# Patient Record
Sex: FEMALE | Race: WHITE | Employment: OTHER | URBAN - METROPOLITAN AREA
[De-identification: names, ages, dates, MRNs, and addresses within clinical notes are randomized per-mention and may not be internally consistent; named-entity substitution may affect disease eponyms.]

---

## 2017-01-11 PROCEDURE — 84484 ASSAY OF TROPONIN QUANT: CPT | Performed by: INTERNAL MEDICINE

## 2017-01-11 PROCEDURE — 80061 LIPID PANEL: CPT | Performed by: INTERNAL MEDICINE

## 2017-01-11 PROCEDURE — 36415 COLL VENOUS BLD VENIPUNCTURE: CPT | Performed by: INTERNAL MEDICINE

## 2017-11-28 PROCEDURE — 36415 COLL VENOUS BLD VENIPUNCTURE: CPT | Performed by: INTERNAL MEDICINE

## 2017-11-28 PROCEDURE — 83516 IMMUNOASSAY NONANTIBODY: CPT | Performed by: INTERNAL MEDICINE

## 2017-11-28 PROCEDURE — 82784 ASSAY IGA/IGD/IGG/IGM EACH: CPT | Performed by: INTERNAL MEDICINE

## 2018-01-09 PROBLEM — D50.9 IRON DEFICIENCY ANEMIA, UNSPECIFIED IRON DEFICIENCY ANEMIA TYPE: Status: ACTIVE | Noted: 2018-01-09

## 2018-01-09 PROCEDURE — 88305 TISSUE EXAM BY PATHOLOGIST: CPT | Performed by: INTERNAL MEDICINE

## 2020-03-25 PROBLEM — I27.20 PULMONARY HYPERTENSION (HCC): Status: ACTIVE | Noted: 2020-03-25

## 2020-03-25 PROBLEM — I50.32 CHRONIC DIASTOLIC CONGESTIVE HEART FAILURE (HCC): Status: RESOLVED | Noted: 2020-03-25 | Resolved: 2020-03-25

## 2020-03-25 PROBLEM — I50.22 CHRONIC SYSTOLIC CONGESTIVE HEART FAILURE (HCC): Status: ACTIVE | Noted: 2020-03-25

## 2020-03-25 PROBLEM — I50.32 CHRONIC DIASTOLIC CONGESTIVE HEART FAILURE (HCC): Status: ACTIVE | Noted: 2020-03-25

## 2020-03-25 PROBLEM — I47.1 PSVT (PAROXYSMAL SUPRAVENTRICULAR TACHYCARDIA) (HCC): Status: ACTIVE | Noted: 2020-03-25

## 2020-03-25 PROBLEM — I47.10 PSVT (PAROXYSMAL SUPRAVENTRICULAR TACHYCARDIA) (HCC): Status: ACTIVE | Noted: 2020-03-25

## 2023-01-26 ENCOUNTER — APPOINTMENT (OUTPATIENT)
Dept: CV DIAGNOSTICS | Facility: HOSPITAL | Age: 78
End: 2023-01-26
Attending: HOSPITALIST
Payer: MEDICARE

## 2023-01-26 ENCOUNTER — APPOINTMENT (OUTPATIENT)
Dept: GENERAL RADIOLOGY | Facility: HOSPITAL | Age: 78
End: 2023-01-26
Attending: EMERGENCY MEDICINE
Payer: MEDICARE

## 2023-01-26 ENCOUNTER — HOSPITAL ENCOUNTER (OUTPATIENT)
Facility: HOSPITAL | Age: 78
Setting detail: OBSERVATION
Discharge: HOME | End: 2023-01-27
Attending: EMERGENCY MEDICINE | Admitting: INTERNAL MEDICINE
Payer: MEDICARE

## 2023-01-26 DIAGNOSIS — R06.02 SHORTNESS OF BREATH: ICD-10-CM

## 2023-01-26 DIAGNOSIS — I48.91 ATRIAL FIBRILLATION WITH RVR (HCC): Primary | ICD-10-CM

## 2023-01-26 DIAGNOSIS — E87.6 HYPOKALEMIA: ICD-10-CM

## 2023-01-26 DIAGNOSIS — R77.8 ELEVATED TROPONIN: ICD-10-CM

## 2023-01-26 PROBLEM — R79.89 ELEVATED TROPONIN: Status: ACTIVE | Noted: 2023-01-26

## 2023-01-26 LAB
ALBUMIN SERPL-MCNC: 2.5 G/DL (ref 3.4–5)
ALBUMIN/GLOB SERPL: 0.6 {RATIO} (ref 1–2)
ALP LIVER SERPL-CCNC: 71 U/L
ALT SERPL-CCNC: 18 U/L
ANION GAP SERPL CALC-SCNC: 6 MMOL/L (ref 0–18)
APTT PPP: 25.1 SECONDS (ref 23.3–35.6)
APTT PPP: 49.9 SECONDS (ref 23.3–35.6)
APTT PPP: 56.3 SECONDS (ref 23.3–35.6)
AST SERPL-CCNC: 17 U/L (ref 15–37)
BASOPHILS # BLD AUTO: 0.1 X10(3) UL (ref 0–0.2)
BASOPHILS NFR BLD AUTO: 1 %
BILIRUB SERPL-MCNC: 0.5 MG/DL (ref 0.1–2)
BUN BLD-MCNC: 12 MG/DL (ref 7–18)
CALCIUM BLD-MCNC: 8.5 MG/DL (ref 8.5–10.1)
CHLORIDE SERPL-SCNC: 109 MMOL/L (ref 98–112)
CHOLEST SERPL-MCNC: 209 MG/DL (ref ?–200)
CO2 SERPL-SCNC: 27 MMOL/L (ref 21–32)
CREAT BLD-MCNC: 1.07 MG/DL
EOSINOPHIL # BLD AUTO: 0.22 X10(3) UL (ref 0–0.7)
EOSINOPHIL NFR BLD AUTO: 2.3 %
ERYTHROCYTE [DISTWIDTH] IN BLOOD BY AUTOMATED COUNT: 13 %
GFR SERPLBLD BASED ON 1.73 SQ M-ARVRAT: 54 ML/MIN/1.73M2 (ref 60–?)
GLOBULIN PLAS-MCNC: 4.4 G/DL (ref 2.8–4.4)
GLUCOSE BLD-MCNC: 155 MG/DL (ref 70–99)
HCT VFR BLD AUTO: 48.9 %
HDLC SERPL-MCNC: 70 MG/DL (ref 40–59)
HGB BLD-MCNC: 16.4 G/DL
IMM GRANULOCYTES # BLD AUTO: 0.02 X10(3) UL (ref 0–1)
IMM GRANULOCYTES NFR BLD: 0.2 %
INR BLD: 1.05 (ref 0.85–1.16)
LDLC SERPL CALC-MCNC: 118 MG/DL (ref ?–100)
LYMPHOCYTES # BLD AUTO: 1.94 X10(3) UL (ref 1–4)
LYMPHOCYTES NFR BLD AUTO: 20.2 %
MCH RBC QN AUTO: 30 PG (ref 26–34)
MCHC RBC AUTO-ENTMCNC: 33.5 G/DL (ref 31–37)
MCV RBC AUTO: 89.4 FL
MONOCYTES # BLD AUTO: 0.87 X10(3) UL (ref 0.1–1)
MONOCYTES NFR BLD AUTO: 9.1 %
NEUTROPHILS # BLD AUTO: 6.46 X10 (3) UL (ref 1.5–7.7)
NEUTROPHILS # BLD AUTO: 6.46 X10(3) UL (ref 1.5–7.7)
NEUTROPHILS NFR BLD AUTO: 67.2 %
NONHDLC SERPL-MCNC: 139 MG/DL (ref ?–130)
NT-PROBNP SERPL-MCNC: 924 PG/ML (ref ?–450)
OSMOLALITY SERPL CALC.SUM OF ELEC: 297 MOSM/KG (ref 275–295)
PLATELET # BLD AUTO: 259 10(3)UL (ref 150–450)
POTASSIUM SERPL-SCNC: 3.3 MMOL/L (ref 3.5–5.1)
PROT SERPL-MCNC: 6.9 G/DL (ref 6.4–8.2)
PROTHROMBIN TIME: 13.7 SECONDS (ref 11.6–14.8)
Q-T INTERVAL: 312 MS
QRS DURATION: 84 MS
QTC CALCULATION (BEZET): 476 MS
R AXIS: -47 DEGREES
RBC # BLD AUTO: 5.47 X10(6)UL
SARS-COV-2 RNA RESP QL NAA+PROBE: NOT DETECTED
SODIUM SERPL-SCNC: 142 MMOL/L (ref 136–145)
T AXIS: -86 DEGREES
TRIGL SERPL-MCNC: 120 MG/DL (ref 30–149)
TROPONIN I HIGH SENSITIVITY: 147 NG/L
TROPONIN I HIGH SENSITIVITY: 151 NG/L
TSI SER-ACNC: 0.68 MIU/ML (ref 0.36–3.74)
VENTRICULAR RATE: 140 BPM
VLDLC SERPL CALC-MCNC: 21 MG/DL (ref 0–30)
WBC # BLD AUTO: 9.6 X10(3) UL (ref 4–11)

## 2023-01-26 PROCEDURE — 99285 EMERGENCY DEPT VISIT HI MDM: CPT

## 2023-01-26 PROCEDURE — 93005 ELECTROCARDIOGRAM TRACING: CPT

## 2023-01-26 PROCEDURE — 93010 ELECTROCARDIOGRAM REPORT: CPT

## 2023-01-26 PROCEDURE — 84443 ASSAY THYROID STIM HORMONE: CPT | Performed by: HOSPITALIST

## 2023-01-26 PROCEDURE — 85025 COMPLETE CBC W/AUTO DIFF WBC: CPT | Performed by: EMERGENCY MEDICINE

## 2023-01-26 PROCEDURE — 71045 X-RAY EXAM CHEST 1 VIEW: CPT | Performed by: EMERGENCY MEDICINE

## 2023-01-26 PROCEDURE — 96368 THER/DIAG CONCURRENT INF: CPT

## 2023-01-26 PROCEDURE — 80061 LIPID PANEL: CPT | Performed by: EMERGENCY MEDICINE

## 2023-01-26 PROCEDURE — 85730 THROMBOPLASTIN TIME PARTIAL: CPT | Performed by: EMERGENCY MEDICINE

## 2023-01-26 PROCEDURE — 93306 TTE W/DOPPLER COMPLETE: CPT | Performed by: HOSPITALIST

## 2023-01-26 PROCEDURE — 80053 COMPREHEN METABOLIC PANEL: CPT | Performed by: EMERGENCY MEDICINE

## 2023-01-26 PROCEDURE — 96366 THER/PROPH/DIAG IV INF ADDON: CPT

## 2023-01-26 PROCEDURE — 84484 ASSAY OF TROPONIN QUANT: CPT | Performed by: EMERGENCY MEDICINE

## 2023-01-26 PROCEDURE — 93010 ELECTROCARDIOGRAM REPORT: CPT | Performed by: INTERNAL MEDICINE

## 2023-01-26 PROCEDURE — 84484 ASSAY OF TROPONIN QUANT: CPT | Performed by: HOSPITALIST

## 2023-01-26 PROCEDURE — 85610 PROTHROMBIN TIME: CPT | Performed by: EMERGENCY MEDICINE

## 2023-01-26 PROCEDURE — 96365 THER/PROPH/DIAG IV INF INIT: CPT

## 2023-01-26 PROCEDURE — 85730 THROMBOPLASTIN TIME PARTIAL: CPT | Performed by: HOSPITALIST

## 2023-01-26 PROCEDURE — 83880 ASSAY OF NATRIURETIC PEPTIDE: CPT | Performed by: EMERGENCY MEDICINE

## 2023-01-26 RX ORDER — HEPARIN SODIUM 1000 [USP'U]/ML
5000 INJECTION, SOLUTION INTRAVENOUS; SUBCUTANEOUS ONCE
Status: COMPLETED | OUTPATIENT
Start: 2023-01-26 | End: 2023-01-26

## 2023-01-26 RX ORDER — PANTOPRAZOLE SODIUM 40 MG/1
40 TABLET, DELAYED RELEASE ORAL
Status: DISCONTINUED | OUTPATIENT
Start: 2023-01-27 | End: 2023-01-27

## 2023-01-26 RX ORDER — POTASSIUM CHLORIDE 20 MEQ/1
40 TABLET, EXTENDED RELEASE ORAL ONCE
Status: COMPLETED | OUTPATIENT
Start: 2023-01-26 | End: 2023-01-26

## 2023-01-26 RX ORDER — HEPARIN SODIUM AND DEXTROSE 10000; 5 [USP'U]/100ML; G/100ML
1000 INJECTION INTRAVENOUS ONCE
Status: COMPLETED | OUTPATIENT
Start: 2023-01-26 | End: 2023-01-27

## 2023-01-26 RX ORDER — HEPARIN SODIUM AND DEXTROSE 10000; 5 [USP'U]/100ML; G/100ML
INJECTION INTRAVENOUS CONTINUOUS
Status: DISCONTINUED | OUTPATIENT
Start: 2023-01-26 | End: 2023-01-27

## 2023-01-26 RX ORDER — ASPIRIN 81 MG/1
81 TABLET, CHEWABLE ORAL DAILY
Status: DISCONTINUED | OUTPATIENT
Start: 2023-01-27 | End: 2023-01-27

## 2023-01-26 NOTE — ED INITIAL ASSESSMENT (HPI)
Patient was at the cardiologist today, 6 month follow up for a new medication.   Patient was found to be in a-fib,   Denies SOB, palpitations, chest pain, or other  Complaints

## 2023-01-26 NOTE — ED QUICK NOTES
Orders for admission, patient is aware of plan and ready to go upstairs. Any questions, please call ED RN Adan Che  at extension [phone]. Vaccinated?  yes  Type of COVID test sent:Rapid  COVID Suspicion level: Low      Titratable drug(s) infusing:  Rate: Heparin    LOC at time of transport:A0X4    Other pertinent information:N/A    CIWA score=N/A  NIH score=N/A

## 2023-01-26 NOTE — PLAN OF CARE
Admission navigator completed with patient. Updated on POC, oriented to room/unit. All questions answered. Patient alert and oriented x 4. On RA. Up with SBA. NSR/SR on tele. Continent of bowel/bladder. No complaints of pain, shortness of breath, chest pain/discomfort. POC: cards to see, heparin gtt. Call light within reach. Fall precautions in place. Converted to NSR at 1557, EKG obtained    Problem: Patient/Family Goals  Goal: Patient/Family Long Term Goal  Description: Patient's Long Term Goal: to go home    Interventions:  - cardiology to see  -monitor HR  - See additional Care Plan goals for specific interventions  Outcome: Progressing  Goal: Patient/Family Short Term Goal  Description: Patient's Short Term Goal: manage heart rate/rhythm    Interventions:   - meds as ordered  -monitor tele  - See additional Care Plan goals for specific interventions  Outcome: Progressing     Problem: CARDIOVASCULAR - ADULT  Goal: Maintains optimal cardiac output and hemodynamic stability  Description: INTERVENTIONS:  - Monitor vital signs, rhythm, and trends  - Monitor for bleeding, hypotension and signs of decreased cardiac output  - Evaluate effectiveness of vasoactive medications to optimize hemodynamic stability  - Monitor arterial and/or venous puncture sites for bleeding and/or hematoma  - Assess quality of pulses, skin color and temperature  - Assess for signs of decreased coronary artery perfusion - ex.  Angina  - Evaluate fluid balance, assess for edema, trend weights  Outcome: Progressing  Goal: Absence of cardiac arrhythmias or at baseline  Description: INTERVENTIONS:  - Continuous cardiac monitoring, monitor vital signs, obtain 12 lead EKG if indicated  - Evaluate effectiveness of antiarrhythmic and heart rate control medications as ordered  - Initiate emergency measures for life threatening arrhythmias  - Monitor electrolytes and administer replacement therapy as ordered  Outcome: Progressing

## 2023-01-27 VITALS
RESPIRATION RATE: 17 BRPM | BODY MASS INDEX: 40.36 KG/M2 | SYSTOLIC BLOOD PRESSURE: 157 MMHG | OXYGEN SATURATION: 92 % | TEMPERATURE: 99 F | HEIGHT: 63 IN | HEART RATE: 66 BPM | DIASTOLIC BLOOD PRESSURE: 92 MMHG | WEIGHT: 227.75 LBS

## 2023-01-27 LAB
ANION GAP SERPL CALC-SCNC: 6 MMOL/L (ref 0–18)
APTT PPP: 61.3 SECONDS (ref 23.3–35.6)
APTT PPP: 63.4 SECONDS (ref 23.3–35.6)
ATRIAL RATE: 59 BPM
BASOPHILS # BLD AUTO: 0.09 X10(3) UL (ref 0–0.2)
BASOPHILS NFR BLD AUTO: 1.1 %
BUN BLD-MCNC: 11 MG/DL (ref 7–18)
CALCIUM BLD-MCNC: 8.2 MG/DL (ref 8.5–10.1)
CHLORIDE SERPL-SCNC: 112 MMOL/L (ref 98–112)
CO2 SERPL-SCNC: 27 MMOL/L (ref 21–32)
CREAT BLD-MCNC: 0.78 MG/DL
EOSINOPHIL # BLD AUTO: 0.23 X10(3) UL (ref 0–0.7)
EOSINOPHIL NFR BLD AUTO: 2.7 %
ERYTHROCYTE [DISTWIDTH] IN BLOOD BY AUTOMATED COUNT: 13.2 %
GFR SERPLBLD BASED ON 1.73 SQ M-ARVRAT: 79 ML/MIN/1.73M2 (ref 60–?)
GLUCOSE BLD-MCNC: 100 MG/DL (ref 70–99)
HCT VFR BLD AUTO: 45 %
HGB BLD-MCNC: 14.7 G/DL
IMM GRANULOCYTES # BLD AUTO: 0.02 X10(3) UL (ref 0–1)
IMM GRANULOCYTES NFR BLD: 0.2 %
LYMPHOCYTES # BLD AUTO: 2.56 X10(3) UL (ref 1–4)
LYMPHOCYTES NFR BLD AUTO: 30.5 %
MCH RBC QN AUTO: 30 PG (ref 26–34)
MCHC RBC AUTO-ENTMCNC: 32.7 G/DL (ref 31–37)
MCV RBC AUTO: 91.8 FL
MONOCYTES # BLD AUTO: 0.9 X10(3) UL (ref 0.1–1)
MONOCYTES NFR BLD AUTO: 10.7 %
NEUTROPHILS # BLD AUTO: 4.58 X10 (3) UL (ref 1.5–7.7)
NEUTROPHILS # BLD AUTO: 4.58 X10(3) UL (ref 1.5–7.7)
NEUTROPHILS NFR BLD AUTO: 54.8 %
OSMOLALITY SERPL CALC.SUM OF ELEC: 299 MOSM/KG (ref 275–295)
P AXIS: 71 DEGREES
P-R INTERVAL: 154 MS
PLATELET # BLD AUTO: 222 10(3)UL (ref 150–450)
POTASSIUM SERPL-SCNC: 3.6 MMOL/L (ref 3.5–5.1)
Q-T INTERVAL: 468 MS
QRS DURATION: 82 MS
QTC CALCULATION (BEZET): 463 MS
R AXIS: -44 DEGREES
RBC # BLD AUTO: 4.9 X10(6)UL
SODIUM SERPL-SCNC: 145 MMOL/L (ref 136–145)
T AXIS: -75 DEGREES
TROPONIN I HIGH SENSITIVITY: 122 NG/L
VENTRICULAR RATE: 59 BPM
WBC # BLD AUTO: 8.4 X10(3) UL (ref 4–11)

## 2023-01-27 PROCEDURE — 80048 BASIC METABOLIC PNL TOTAL CA: CPT | Performed by: HOSPITALIST

## 2023-01-27 PROCEDURE — 85730 THROMBOPLASTIN TIME PARTIAL: CPT | Performed by: HOSPITALIST

## 2023-01-27 PROCEDURE — 85025 COMPLETE CBC W/AUTO DIFF WBC: CPT | Performed by: HOSPITALIST

## 2023-01-27 PROCEDURE — 84484 ASSAY OF TROPONIN QUANT: CPT | Performed by: INTERNAL MEDICINE

## 2023-01-27 RX ORDER — DILTIAZEM HYDROCHLORIDE 240 MG/1
240 CAPSULE, COATED, EXTENDED RELEASE ORAL DAILY
Status: DISCONTINUED | OUTPATIENT
Start: 2023-01-27 | End: 2023-01-27

## 2023-01-27 RX ORDER — LOSARTAN POTASSIUM 50 MG/1
50 TABLET ORAL DAILY
Status: DISCONTINUED | OUTPATIENT
Start: 2023-01-27 | End: 2023-01-27

## 2023-01-27 RX ORDER — LOSARTAN POTASSIUM 50 MG/1
50 TABLET ORAL DAILY
Qty: 90 TABLET | Refills: 3 | Status: SHIPPED | OUTPATIENT
Start: 2023-01-27

## 2023-01-27 RX ORDER — LEVOTHYROXINE SODIUM 0.15 MG/1
150 TABLET ORAL
COMMUNITY
Start: 2022-12-13

## 2023-01-27 RX ORDER — DILTIAZEM HYDROCHLORIDE 180 MG/1
180 CAPSULE, COATED, EXTENDED RELEASE ORAL DAILY
Qty: 90 CAPSULE | Refills: 3 | Status: SHIPPED | OUTPATIENT
Start: 2023-01-27

## 2023-01-27 RX ORDER — AMLODIPINE BESYLATE 5 MG/1
5 TABLET ORAL DAILY
Status: DISCONTINUED | OUTPATIENT
Start: 2023-01-27 | End: 2023-01-27

## 2023-01-27 RX ORDER — LEVOTHYROXINE SODIUM 0.15 MG/1
150 TABLET ORAL
Status: DISCONTINUED | OUTPATIENT
Start: 2023-01-27 | End: 2023-01-27

## 2023-01-27 RX ORDER — METOPROLOL SUCCINATE 100 MG/1
100 TABLET, EXTENDED RELEASE ORAL
Status: DISCONTINUED | OUTPATIENT
Start: 2023-01-27 | End: 2023-01-27

## 2023-01-27 RX ORDER — DILTIAZEM HYDROCHLORIDE 180 MG/1
180 CAPSULE, EXTENDED RELEASE ORAL DAILY
Status: DISCONTINUED | OUTPATIENT
Start: 2023-01-27 | End: 2023-01-27

## 2023-01-27 RX ORDER — LISINOPRIL 20 MG/1
20 TABLET ORAL
COMMUNITY
Start: 2022-12-13 | End: 2023-01-27

## 2023-01-27 NOTE — PLAN OF CARE
Pt. Alert and oriented x4. No pain/discomforts made. Resp. Regular and easy. On RA. SR on Tele. Cardizem drip was discontinued. Heparin drip still ongoing. Bleeding precautions maintained. Up w/ minimal assist. POC explained. Verbalized understanding. Needs attended promptly. Call light w/in reach. 36 - Dr. Joseph Mems  aware of troponin results and resumed Metoprolol and Amlodipine as clarified. Problem: Patient/Family Goals  Goal: Patient/Family Long Term Goal  Description: Patient's Long Term Goal: to go home    Interventions:  - cardiology to see  -monitor HR  - See additional Care Plan goals for specific interventions  Outcome: Progressing  Goal: Patient/Family Short Term Goal  Description: Patient's Short Term Goal: manage heart rate/rhythm    Interventions:   - meds as ordered  -monitor tele  - See additional Care Plan goals for specific interventions  Outcome: Progressing     Problem: CARDIOVASCULAR - ADULT  Goal: Maintains optimal cardiac output and hemodynamic stability  Description: INTERVENTIONS:  - Monitor vital signs, rhythm, and trends  - Monitor for bleeding, hypotension and signs of decreased cardiac output  - Evaluate effectiveness of vasoactive medications to optimize hemodynamic stability  - Monitor arterial and/or venous puncture sites for bleeding and/or hematoma  - Assess quality of pulses, skin color and temperature  - Assess for signs of decreased coronary artery perfusion - ex.  Angina  - Evaluate fluid balance, assess for edema, trend weights  Outcome: Progressing  Goal: Absence of cardiac arrhythmias or at baseline  Description: INTERVENTIONS:  - Continuous cardiac monitoring, monitor vital signs, obtain 12 lead EKG if indicated  - Evaluate effectiveness of antiarrhythmic and heart rate control medications as ordered  - Initiate emergency measures for life threatening arrhythmias  - Monitor electrolytes and administer replacement therapy as ordered  Outcome: Progressing

## 2023-01-27 NOTE — PLAN OF CARE
Patient laying in bed, denies chest pain, shortness of breath and dizziness. Patient alert and  oriented, on RA, up at priyanka in room. Heparin gtt discontinued. NSR on cardiac monitor. Plan for possible discharge. Call light with in reach, fall precautions reviewed, all questions answered. Problem: CARDIOVASCULAR - ADULT  Goal: Maintains optimal cardiac output and hemodynamic stability  Description: INTERVENTIONS:  - Monitor vital signs, rhythm, and trends  - Monitor for bleeding, hypotension and signs of decreased cardiac output  - Evaluate effectiveness of vasoactive medications to optimize hemodynamic stability  - Monitor arterial and/or venous puncture sites for bleeding and/or hematoma  - Assess quality of pulses, skin color and temperature  - Assess for signs of decreased coronary artery perfusion - ex.  Angina  - Evaluate fluid balance, assess for edema, trend weights  Outcome: Progressing

## 2023-01-27 NOTE — CM/SW NOTE
Script for eliquis sent to patient's [facility] (phone [phone])--informed eliquis not formulary medication (Raji Foster is)  Number for pharmacy  ID [id number]  apn to send xarelto script--need to check cost shortly    Cost of xarelto to be $40.oo per month once $241. oo deductible met--applied 30 day free trial card--pharmacy open 24-hours

## 2025-06-04 ENCOUNTER — HOSPITAL ENCOUNTER (INPATIENT)
Facility: HOSPITAL | Age: 80
LOS: 6 days | Discharge: HOME HEALTH | End: 2025-06-10
Attending: EMERGENCY MEDICINE | Admitting: INTERNAL MEDICINE
Payer: MEDICARE

## 2025-06-04 ENCOUNTER — APPOINTMENT (OUTPATIENT)
Dept: GENERAL RADIOLOGY | Facility: HOSPITAL | Age: 80
End: 2025-06-04
Attending: EMERGENCY MEDICINE
Payer: MEDICARE

## 2025-06-04 ENCOUNTER — HOSPITAL ENCOUNTER (INPATIENT)
Facility: HOSPITAL | Age: 80
LOS: 6 days | Discharge: HOME | End: 2025-06-10
Attending: EMERGENCY MEDICINE | Admitting: INTERNAL MEDICINE
Payer: MEDICARE

## 2025-06-04 DIAGNOSIS — I50.9 ACUTE CONGESTIVE HEART FAILURE, UNSPECIFIED HEART FAILURE TYPE (HCC): Primary | ICD-10-CM

## 2025-06-04 LAB
ALBUMIN SERPL-MCNC: 4.2 G/DL (ref 3.2–4.8)
ALBUMIN/GLOB SERPL: 1.5 {RATIO} (ref 1–2)
ALP LIVER SERPL-CCNC: 61 U/L (ref 55–142)
ALT SERPL-CCNC: 31 U/L (ref 10–49)
ANION GAP SERPL CALC-SCNC: 13 MMOL/L (ref 0–18)
AST SERPL-CCNC: 36 U/L (ref ?–34)
ATRIAL RATE: 357 BPM
BASOPHILS # BLD AUTO: 0.1 X10(3) UL (ref 0–0.2)
BASOPHILS NFR BLD AUTO: 0.8 %
BILIRUB SERPL-MCNC: 0.8 MG/DL (ref 0.2–1.1)
BUN BLD-MCNC: 19 MG/DL (ref 9–23)
CALCIUM BLD-MCNC: 8.9 MG/DL (ref 8.7–10.6)
CHLORIDE SERPL-SCNC: 106 MMOL/L (ref 98–112)
CHOLEST SERPL-MCNC: 144 MG/DL (ref ?–200)
CO2 SERPL-SCNC: 26 MMOL/L (ref 21–32)
CREAT BLD-MCNC: 1.26 MG/DL (ref 0.55–1.02)
EGFRCR SERPLBLD CKD-EPI 2021: 44 ML/MIN/1.73M2 (ref 60–?)
EOSINOPHIL # BLD AUTO: 0.23 X10(3) UL (ref 0–0.7)
EOSINOPHIL NFR BLD AUTO: 1.8 %
ERYTHROCYTE [DISTWIDTH] IN BLOOD BY AUTOMATED COUNT: 18.3 %
GLOBULIN PLAS-MCNC: 2.8 G/DL (ref 2–3.5)
GLUCOSE BLD-MCNC: 127 MG/DL (ref 70–99)
HCT VFR BLD AUTO: 41.6 % (ref 35–48)
HDLC SERPL-MCNC: 56 MG/DL (ref 40–59)
HGB BLD-MCNC: 12.6 G/DL (ref 12–16)
IMM GRANULOCYTES # BLD AUTO: 0.06 X10(3) UL (ref 0–1)
IMM GRANULOCYTES NFR BLD: 0.5 %
LDLC SERPL CALC-MCNC: 69 MG/DL (ref ?–100)
LYMPHOCYTES # BLD AUTO: 3.49 X10(3) UL (ref 1–4)
LYMPHOCYTES NFR BLD AUTO: 27 %
MCH RBC QN AUTO: 23.3 PG (ref 26–34)
MCHC RBC AUTO-ENTMCNC: 30.3 G/DL (ref 31–37)
MCV RBC AUTO: 77 FL (ref 80–100)
MONOCYTES # BLD AUTO: 1.35 X10(3) UL (ref 0.1–1)
MONOCYTES NFR BLD AUTO: 10.4 %
NEUTROPHILS # BLD AUTO: 7.71 X10 (3) UL (ref 1.5–7.7)
NEUTROPHILS # BLD AUTO: 7.71 X10(3) UL (ref 1.5–7.7)
NEUTROPHILS NFR BLD AUTO: 59.5 %
NONHDLC SERPL-MCNC: 88 MG/DL (ref ?–130)
NT-PROBNP SERPL-MCNC: 2446 PG/ML (ref ?–450)
OSMOLALITY SERPL CALC.SUM OF ELEC: 304 MOSM/KG (ref 275–295)
PLATELET # BLD AUTO: 321 10(3)UL (ref 150–450)
POTASSIUM SERPL-SCNC: 4.1 MMOL/L (ref 3.5–5.1)
PROT SERPL-MCNC: 7 G/DL (ref 5.7–8.2)
Q-T INTERVAL: 328 MS
QRS DURATION: 82 MS
QTC CALCULATION (BEZET): 495 MS
R AXIS: -22 DEGREES
RBC # BLD AUTO: 5.4 X10(6)UL (ref 3.8–5.3)
SODIUM SERPL-SCNC: 145 MMOL/L (ref 136–145)
T AXIS: 217 DEGREES
T4 FREE SERPL-MCNC: 2.1 NG/DL (ref 0.8–1.7)
TRIGL SERPL-MCNC: 102 MG/DL (ref 30–149)
TROPONIN I SERPL HS-MCNC: 55 NG/L (ref ?–34)
TROPONIN I SERPL HS-MCNC: 61 NG/L (ref ?–34)
TSI SER-ACNC: 0.55 UIU/ML (ref 0.55–4.78)
VENTRICULAR RATE: 137 BPM
VLDLC SERPL CALC-MCNC: 15 MG/DL (ref 0–30)
WBC # BLD AUTO: 12.9 X10(3) UL (ref 4–11)

## 2025-06-04 PROCEDURE — 93010 ELECTROCARDIOGRAM REPORT: CPT

## 2025-06-04 PROCEDURE — 99285 EMERGENCY DEPT VISIT HI MDM: CPT

## 2025-06-04 PROCEDURE — 84484 ASSAY OF TROPONIN QUANT: CPT | Performed by: EMERGENCY MEDICINE

## 2025-06-04 PROCEDURE — 96374 THER/PROPH/DIAG INJ IV PUSH: CPT

## 2025-06-04 PROCEDURE — 71045 X-RAY EXAM CHEST 1 VIEW: CPT | Performed by: EMERGENCY MEDICINE

## 2025-06-04 PROCEDURE — 84439 ASSAY OF FREE THYROXINE: CPT | Performed by: INTERNAL MEDICINE

## 2025-06-04 PROCEDURE — 80061 LIPID PANEL: CPT | Performed by: EMERGENCY MEDICINE

## 2025-06-04 PROCEDURE — 84443 ASSAY THYROID STIM HORMONE: CPT | Performed by: INTERNAL MEDICINE

## 2025-06-04 PROCEDURE — 85025 COMPLETE CBC W/AUTO DIFF WBC: CPT | Performed by: EMERGENCY MEDICINE

## 2025-06-04 PROCEDURE — 83880 ASSAY OF NATRIURETIC PEPTIDE: CPT | Performed by: EMERGENCY MEDICINE

## 2025-06-04 PROCEDURE — 80053 COMPREHEN METABOLIC PANEL: CPT | Performed by: EMERGENCY MEDICINE

## 2025-06-04 PROCEDURE — 93005 ELECTROCARDIOGRAM TRACING: CPT

## 2025-06-04 PROCEDURE — 84484 ASSAY OF TROPONIN QUANT: CPT | Performed by: INTERNAL MEDICINE

## 2025-06-04 RX ORDER — FUROSEMIDE 10 MG/ML
40 INJECTION INTRAMUSCULAR; INTRAVENOUS ONCE
Status: COMPLETED | OUTPATIENT
Start: 2025-06-04 | End: 2025-06-04

## 2025-06-04 RX ORDER — FUROSEMIDE 10 MG/ML
20 INJECTION INTRAMUSCULAR; INTRAVENOUS ONCE
Status: COMPLETED | OUTPATIENT
Start: 2025-06-04 | End: 2025-06-04

## 2025-06-04 RX ORDER — PANTOPRAZOLE SODIUM 40 MG/1
40 TABLET, DELAYED RELEASE ORAL
Status: DISCONTINUED | OUTPATIENT
Start: 2025-06-04 | End: 2025-06-10

## 2025-06-04 RX ORDER — LOSARTAN POTASSIUM 100 MG/1
TABLET ORAL DAILY
COMMUNITY
End: 2025-06-10

## 2025-06-04 RX ORDER — LEVOTHYROXINE SODIUM 150 UG/1
150 TABLET ORAL
Status: DISCONTINUED | OUTPATIENT
Start: 2025-06-05 | End: 2025-06-05

## 2025-06-04 RX ORDER — POTASSIUM CHLORIDE 1500 MG/1
30 TABLET, EXTENDED RELEASE ORAL DAILY
COMMUNITY
End: 2025-06-10

## 2025-06-04 RX ORDER — PANTOPRAZOLE SODIUM 40 MG/1
40 TABLET, DELAYED RELEASE ORAL
Status: ON HOLD | COMMUNITY
End: 2025-06-10

## 2025-06-04 RX ORDER — SPIRONOLACTONE 25 MG/1
12.5 TABLET ORAL DAILY
Status: DISCONTINUED | OUTPATIENT
Start: 2025-06-04 | End: 2025-06-10

## 2025-06-04 RX ORDER — SPIRONOLACTONE 25 MG/1
12.5 TABLET ORAL DAILY
COMMUNITY

## 2025-06-04 RX ORDER — METOPROLOL SUCCINATE 50 MG/1
50 TABLET, EXTENDED RELEASE ORAL
Status: DISCONTINUED | OUTPATIENT
Start: 2025-06-04 | End: 2025-06-06

## 2025-06-04 RX ORDER — DILTIAZEM HYDROCHLORIDE 5 MG/ML
10 INJECTION INTRAVENOUS ONCE
Status: COMPLETED | OUTPATIENT
Start: 2025-06-04 | End: 2025-06-04

## 2025-06-04 RX ORDER — FUROSEMIDE 20 MG/1
30 TABLET ORAL DAILY
COMMUNITY
End: 2025-06-10

## 2025-06-04 NOTE — ED PROVIDER NOTES
Patient Seen in: [facility] Emergency Department        History  Chief Complaint   Patient presents with    Difficulty Breathing     Stated Complaint: ANDREA for a month    Subjective:   HPI            Patient has a medical history notable.  Known A-fib, heart failure  with reduced EF, here for worsening dyspnea exertion for the last month.    No chest pain    No fevers compliant with medications          Objective:     Past Medical History:    CANCER    Thyroid CA    Cancer (HCC)    tyroid    Essential hypertension    HYPOTHYROIDISM    After surgery    Thyroid disease              Past Surgical History:   Procedure Laterality Date    Cholecystectomy  2001    Hysterectomy  1999    Thyroidectomy  1999                Social History     Socioeconomic History    Marital status:    Tobacco Use    Smoking status: Never    Smokeless tobacco: Never   Substance and Sexual Activity    Alcohol use: No    Drug use: No                                Physical Exam    ED Triage Vitals   BP 06/04/25 1352 124/68   Pulse 06/04/25 1352 115   Resp 06/04/25 1352 26   Temp 06/04/25 1355 98.5 °F (36.9 °C)   Temp src 06/04/25 1355 Temporal   SpO2 06/04/25 1352 91 %   O2 Device 06/04/25 1352 None (Room air)       Current Vitals:   Vital Signs  BP: 101/74  Pulse: 91  Resp: 26  Temp: 98.5 °F (36.9 °C)  Temp src: Temporal  MAP (mmHg): 84    Oxygen Therapy  SpO2: 99 %  O2 Device: None (Room air)              Physical Exam    Physical Exam   Constitutional: Awake, alert, well appearing  Head: Normocephalic and atraumatic.   Eyes: Conjunctivae are normal. Pupils are equal, round, and reactive to light.   Neck: Normal range of motion. No JVD  Cardiovascular: Normal rate, regular rhythm    Abdominal: Soft. Not distended.  Neurological: Pt is alert and oriented to person, place, and time. no cranial nerve deficits. Speech fluent      Notably tachypneic, bibasilar crackles    2+ pitting edema in the legs    Appears a bit pale        ED  Course  Labs Reviewed   COMP METABOLIC PANEL (14) - Abnormal; Notable for the following components:       Result Value    Glucose 127 (*)     Creatinine 1.26 (*)     Calculated Osmolality 304 (*)     eGFR-Cr 44 (*)     AST 36 (*)     All other components within normal limits   CBC WITH DIFFERENTIAL WITH PLATELET - Abnormal; Notable for the following components:    WBC 12.9 (*)     RBC 5.40 (*)     MCV 77.0 (*)     MCH 23.3 (*)     MCHC 30.3 (*)     Neutrophil Absolute Prelim 7.71 (*)     Neutrophil Absolute 7.71 (*)     Monocyte Absolute 1.35 (*)     All other components within normal limits   TROPONIN I HIGH SENSITIVITY - Abnormal; Notable for the following components:    Troponin I (High Sensitivity) 61 (*)     All other components within normal limits   PRO BETA NATRIURETIC PEPTIDE - Abnormal; Notable for the following components:    Pro-Beta Natriuretic Peptide 2,446 (*)     All other components within normal limits   LIPID PANEL - Normal   RAINBOW DRAW BLUE   RAINBOW DRAW GOLD     EKG    Rate, intervals and axes as noted on EKG Report.  Rate: 137  Rhythm: Atrial flutter  Reading: Atrial flutter with variable block              Heart rate improved on its own    Blood work reviewed.  Elevated proBNP CBC electrolytes otherwise fine trivial troponin elevation    XR CHEST AP PORTABLE  (CPT=71045)  Result Date: 6/4/2025  CONCLUSION:  Cardiomegaly.  There is pulmonary venous congestion.  There is bibasilar airspace disease with bilateral pleural effusions right greater than left.  Findings consistent with CHF.  Superimposed bibasilar pneumonia not to be excluded.   LOCATION:  [facility]      Dictated by (CST): Brianne Mayer MD on 6/04/2025 at 3:02 PM     Finalized by (CST): Brianne Mayer MD on 6/04/2025 at 3:06 PM           Medications   furosemide (Lasix) 10 mg/mL injection 40 mg (40 mg Intravenous Given 6/4/25 1511)                       MDM         Differential diagnoses considered: Suspect acute CHF.  The possibly  of a life-threatening pneumothorax or ACS was also considered    -Diuresis    -Flutter now rate controlled without intervention already on Xarelto.    Dr. Hagan made aware via [employer] text    Patient will be admitted primarily to the [facility] hospitalist.  Care has been discussed with the admitting physician.      I visualized the radiology studies, my independent interpretation: Consistent with venous congestion and right pleural effusion    *Discussion of ongoing management of this patient's care included: Cardiology, admitting physician      Shared decision making was done by: patient, myself.      Admission disposition: 6/4/2025  3:21 PM           Medical Decision Making      Disposition and Plan     Clinical Impression:  1. Acute congestive heart failure, unspecified heart failure type (HCC)         Disposition:  Admit  6/4/2025  3:21 pm    Follow-up:  No follow-up provider specified.        Medications Prescribed:  Current Discharge Medication List                Supplementary Documentation:         Hospital Problems       Present on Admission  Date Reviewed: 4/4/2022          ICD-10-CM Noted POA    * (Principal) Acute congestive heart failure, unspecified heart failure type (HCC) I50.9 6/4/2025 Unknown

## 2025-06-04 NOTE — PLAN OF CARE
Problem: CARDIOVASCULAR - ADULT  Goal: Maintains optimal cardiac output and hemodynamic stability  Description: INTERVENTIONS:  - Monitor vital signs, rhythm, and trends  - Monitor for bleeding, hypotension and signs of decreased cardiac output  - Evaluate effectiveness of vasoactive medications to optimize hemodynamic stability  - Monitor arterial and/or venous puncture sites for bleeding and/or hematoma  - Assess quality of pulses, skin color and temperature  - Assess for signs of decreased coronary artery perfusion - ex. Angina  - Evaluate fluid balance, assess for edema, trend weights  Outcome: Progressing  Goal: Absence of cardiac arrhythmias or at baseline  Description: INTERVENTIONS:  - Continuous cardiac monitoring, monitor vital signs, obtain 12 lead EKG if indicated  - Evaluate effectiveness of antiarrhythmic and heart rate control medications as ordered  - Initiate emergency measures for life threatening arrhythmias  - Monitor electrolytes and administer replacement therapy as ordered  Outcome: Progressing     Problem: METABOLIC/FLUID AND ELECTROLYTES - ADULT  Goal: Electrolytes maintained within normal limits  Description: INTERVENTIONS:  - Monitor labs and rhythm and assess patient for signs and symptoms of electrolyte imbalances  - Administer electrolyte replacement as ordered  - Monitor response to electrolyte replacements, including rhythm and repeat lab results as appropriate  - Fluid restriction as ordered  - Instruct patient on fluid and nutrition restrictions as appropriate  Outcome: Progressing  Goal: Hemodynamic stability and optimal renal function maintained  Description: INTERVENTIONS:  - Monitor labs and assess for signs and symptoms of volume excess or deficit  - Monitor intake, output and patient weight  - Monitor urine specific gravity, serum osmolarity and serum sodium as indicated or ordered  - Monitor response to interventions for patient's volume status, including labs, urine  output, blood pressure (other measures as available)  - Encourage oral intake as appropriate  - Instruct patient on fluid and nutrition restrictions as appropriate  Outcome: Progressing     Problem: Patient/Family Goals  Goal: Patient/Family Long Term Goal  Description: Patient's Long Term Goal: dc home    Interventions:  - echo  -IV lasix   - See additional Care Plan goals for specific interventions  Outcome: Progressing  Goal: Patient/Family Short Term Goal  Description: Patient's Short Term Goal:     Interventions:   -   - See additional Care Plan goals for specific interventions  Outcome: Progressing

## 2025-06-04 NOTE — PLAN OF CARE
NURSING ADMISSION NOTE      Patient admitted via Cart  Oriented to room.  Safety precautions initiated.  Bed in low position.  Call light in reach.    Navigator complete down in ED  YANG/Ox4, RA  Cardizem gtt @ 10mL/hr

## 2025-06-04 NOTE — H&P
General Medicine H&P     Chief Complaint   Patient presents with    Difficulty Breathing        PCP: Roby Llamas MD    History of Present Illness: Patient is a 79 year old female with PMH including but not limited to thyroid ca, HT, HTN, afib, HFpEF who p/t [facility] ED c SOB x couple months.     Pt has been trying to treat c different meds but got worse last night. +Wheezing, couldn't catch breath. Couldn't sleep. Little papitations, no cp. Legs always swollen, R side worse. She saw WALTER in CHF clinic and aureliano 12.5 mg daily was added. Pt did not tolerate jardiance.     Admit date: 1/26/2023-1/27/2023 for afib/rvr, known to me from then.     .     No lung issues. No tob, 2-3x/wk wine.       Past Medical History[1]   Past Surgical History[2]     ALL:  Allergies[3]     Scheduled Meds[4]    Social History     Tobacco Use    Smoking status: Never    Smokeless tobacco: Never   Substance Use Topics    Alcohol use: No        Fam Hx  Family History[5]    Review of Systems  Comprehensive ROS reviewed and negative except for what's stated above. \    OBJECTIVE:  /74   Pulse 91   Temp 98.5 °F (36.9 °C) (Temporal)   Resp 26   SpO2 99%     General:  Alert, no distress, appears stated age.    Head:  Normocephalic, without obvious abnormality, atraumatic.   Eyes:  Sclera anicteric, EOMs intact.    Nose: Nares normal,  Mucosa normal    Throat: Lips normal   Neck: Supple, symmetrical, trachea midline   Lungs:   Clear to auscultation bilaterally. Normal effort   Chest wall:  No tenderness or deformity   Heart:  Irregular rate and rhythm, S1, S2 normal, no murmur, rub or gallop appreciated   Abdomen:   Soft, NT/ND, Bowel sounds normal. No masses,  No organomegaly.    Extremities/MSK: 2+edema.   Skin: Skin color, texture, turgor normal. No rashes or lesions.    Neurologic: Moving all extremities spontaneously, no focal deficit appreciated          LABS:   Lab Results   Component Value Date    WBC 12.9 06/04/2025    HGB  12.6 06/04/2025    HCT 41.6 06/04/2025    .0 06/04/2025    CREATSERUM 1.26 06/04/2025    BUN 19 06/04/2025     06/04/2025    K 4.1 06/04/2025     06/04/2025    CO2 26.0 06/04/2025     06/04/2025    CA 8.9 06/04/2025    ALB 4.2 06/04/2025    ALKPHO 61 06/04/2025    BILT 0.8 06/04/2025    TP 7.0 06/04/2025    AST 36 06/04/2025    ALT 31 06/04/2025       Radiology: XR CHEST AP PORTABLE  (CPT=71045)  Result Date: 6/4/2025  PROCEDURE:  XR CHEST AP PORTABLE  (CPT=71045)  TECHNIQUE:  AP chest radiograph was obtained.  COMPARISON:  [facility] , XR, XR CHEST AP PORTABLE  (CPT=71045), 1/26/2023, 11:39 AM.  INDICATIONS:  ANDREA for a month  PATIENT STATED HISTORY: (As transcribed by Technologist)  Patient states she has difficulty breathing but no chest pain.                 CONCLUSION:  Cardiomegaly.  There is pulmonary venous congestion.  There is bibasilar airspace disease with bilateral pleural effusions right greater than left.  Findings consistent with CHF.  Superimposed bibasilar pneumonia not to be excluded.   LOCATION:  [facility]      Dictated by (CST): Brianne Mayer MD on 6/04/2025 at 3:02 PM     Finalized by (CST): Brianne Mayer MD on 6/04/2025 at 3:06 PM            ASSESSMENT / PLAN:    79 year old female with PMH including but not limited to thyroid ca, HT, HTN, afib, HFpEF who p/t EH ED c SOB x couple months.      Acute HFpEF   - tele  - cards following, apprec  - IV lasix  - echo  - cont home meds  - daily I/Os, wts    A. fib with RVR   -Cardiology recommendations; IV cardizem  -xarelto   -[id number] =3     Hypothyroidism  -Resume home Synthroid   -Check TSH     Hypertension  -Resume home antihypertensives as HR allows      Morbid obesity BMI >40  -Outpatient follow-up on resolution of above issues      Outpatient records or previous hospital records reviewed. [facility] hospitalist to continue to follow patient while in house. A total of 75 minutes taken.  Greater than 50% face to face encounter.   D/w pt/, Ambar Boland MD Charles Yohannan, MD  [facility] Hospitalist  Pager: [phone]  6/4/2025  3:47 PM               [1]   Past Medical History:   CANCER    Thyroid CA    Cancer (HCC)    tyroid    Essential hypertension    HYPOTHYROIDISM    After surgery    Thyroid disease   [2]   Past Surgical History:  Procedure Laterality Date    Cholecystectomy  2001    Hysterectomy  1999    Thyroidectomy  1999   [3]   Allergies  Allergen Reactions    Adhesive Tape RASH   [4] [5]   Family History  Problem Relation Age of Onset    Hypertension Father     Cancer Father         Throat CA    Hypertension Mother     Diabetes Sister         DM I    Other (Asthma) Daughter     Hypertension Maternal Grandmother     Hypertension Maternal Grandfather     Hypertension Paternal Grandmother     Hypertension Paternal Grandfather

## 2025-06-04 NOTE — CONSULTS
[facility] Cardiology Consultation    Sariah Montague Patient Status:  Emergency    10/18/1945 MRN [id number]   [facility] EMERGENCY DEPARTMENT Attending Ambar Boland MD   Hosp Day # 0 PCP Roby Llamas MD     Reason for Consultation:  CHF      History of Present Illness:  Sariah Montague is a a(n) 79 year old female. She has Paroxysmal Atrial Fibrillation  (xarelto), HFpEF, PSVT, , Hypertension . She has chronic JON and chronic LE edema.  She saw WALTER jose in CHF clinic and aureliano, 12.5 mg daily was added.  Pt did not tolerate jardiance.  She came to ER due to worse JON, orthopnea, LE edema.  Received IV lasix.      History:  Past Medical History[1]  Past Surgical History[2]  Family History[3]      Allergies:  Allergies[4]    Medications:  Scheduled Medications[5]    Continuous Infusions:  Medication Infusions[6]    Social History:   reports that she has never smoked. She has never used smokeless tobacco. She reports that she does not drink alcohol and does not use drugs.    Review of Systems:  All systems were reviewed and are negative except as described above in HPI.    Physical Exam:      Wt Readings from Last 3 Encounters:   23 227 lb 11.8 oz (103.3 kg)   22 220 lb (99.8 kg)   21 228 lb (103.4 kg)       Vitals:    25 1355 25 1445 25 1515 25 1615   BP:  101/74 105/53 115/86   Pulse:  91 116 114   Resp:   24   Temp: 98.5 °F (36.9 °C)      TempSrc: Temporal      SpO2:  99% 98% 100%       Temp:  [98.5 °F (36.9 °C)] 98.5 °F (36.9 °C)  Pulse:  [] 114  Resp:  [24-26] 24  BP: (101-124)/(53-86) 115/86  SpO2:  [91 %-100 %] 100 %    Temp: 98.5 °F (36.9 °C)  Pulse: 114  Resp: 24  BP: 115/86      General:  Appears comfortable  HEENT: No focal deficits.  Neck: No JVD, carotids 2+ no bruits.  Cardiac: Irregular S1S2.  No S3, S4, rub, click.  No murmur.  Lungs: Clear to auscultation and percussion.  Abdomen: Soft, non-tender.   Extremities: 2+  LE edema.  No  clubbing or cyanosis  Neurologic: Alert and oriented, normal affect.  Skin: Warm and dry.     Labs:      HEM:  Recent Labs   Lab 06/04/25  1359   WBC 12.9*   HGB 12.6   HCT 41.6   .0       Chem:  Recent Labs   Lab 06/04/25  1359      K 4.1      CO2 26.0   BUN 19   CREATSERUM 1.26*   ALT 31   AST 36*   ALB 4.2       No results for input(s): \"INR\" in the last 168 hours.                  Lab Results   Component Value Date    TROP 0.147 () 01/11/2017    TROP 0.101 () 11/04/2016    TROP 0.184 () 02/05/2016         Invalid input(s): \"PBNPML\"                 Telemetry: atrial flutter/fib    Laboratories and Data:  Diagnostics:    EKG, 6/4/2025:  atrial flutter, 137/min, NSTW changes    CXR, 6/4/2025:      CONCLUSION:  Cardiomegaly.  There is pulmonary venous congestion.      There is bibasilar airspace disease with bilateral pleural effusions right greater than left.      Findings consistent with CHF.  Superimposed bibasilar pneumonia not to be excluded.           Impression:    1.  Acute HFpEF  2. Paroxysmal Atrial Fibrillation/flutter.  RVR today (which may have triggered #1)  3. Hypertension   4. obesity      Recommend:    1.  Telemetry  2. IV lasix  3. HR control: IV cardizem  4. Continue op CHF meds, DOAC  5. Echo once HR is controlled          Nigel Hagan MD  6/4/2025  4:52 PM         [1]   Past Medical History:   CANCER    Thyroid CA    Cancer (HCC)    tyroid    Essential hypertension    HYPOTHYROIDISM    After surgery    Thyroid disease   [2]   Past Surgical History:  Procedure Laterality Date    Cholecystectomy  2001    Hysterectomy  1999    Thyroidectomy  1999   [3]   Family History  Problem Relation Age of Onset    Hypertension Father     Cancer Father         Throat CA    Hypertension Mother     Diabetes Sister         DM I    Other (Asthma) Daughter     Hypertension Maternal Grandmother     Hypertension Maternal Grandfather     Hypertension Paternal Grandmother      Hypertension Paternal Grandfather    [4]   Allergies  Allergen Reactions    Adhesive Tape RASH   [5] [6]

## 2025-06-05 ENCOUNTER — APPOINTMENT (OUTPATIENT)
Dept: CV DIAGNOSTICS | Facility: HOSPITAL | Age: 80
End: 2025-06-05
Attending: INTERNAL MEDICINE
Payer: MEDICARE

## 2025-06-05 LAB
ANION GAP SERPL CALC-SCNC: 9 MMOL/L (ref 0–18)
BASOPHILS # BLD AUTO: 0.08 X10(3) UL (ref 0–0.2)
BASOPHILS NFR BLD AUTO: 0.8 %
BUN BLD-MCNC: 19 MG/DL (ref 9–23)
CALCIUM BLD-MCNC: 8.2 MG/DL (ref 8.7–10.6)
CHLORIDE SERPL-SCNC: 108 MMOL/L (ref 98–112)
CO2 SERPL-SCNC: 28 MMOL/L (ref 21–32)
CREAT BLD-MCNC: 1.09 MG/DL (ref 0.55–1.02)
EGFRCR SERPLBLD CKD-EPI 2021: 52 ML/MIN/1.73M2 (ref 60–?)
EOSINOPHIL # BLD AUTO: 0.25 X10(3) UL (ref 0–0.7)
EOSINOPHIL NFR BLD AUTO: 2.6 %
ERYTHROCYTE [DISTWIDTH] IN BLOOD BY AUTOMATED COUNT: 17.8 %
GLUCOSE BLD-MCNC: 93 MG/DL (ref 70–99)
HCT VFR BLD AUTO: 36.8 % (ref 35–48)
HGB BLD-MCNC: 11.3 G/DL (ref 12–16)
IMM GRANULOCYTES # BLD AUTO: 0.04 X10(3) UL (ref 0–1)
IMM GRANULOCYTES NFR BLD: 0.4 %
LYMPHOCYTES # BLD AUTO: 2.25 X10(3) UL (ref 1–4)
LYMPHOCYTES NFR BLD AUTO: 23.2 %
MAGNESIUM SERPL-MCNC: 1.7 MG/DL (ref 1.6–2.6)
MCH RBC QN AUTO: 23.6 PG (ref 26–34)
MCHC RBC AUTO-ENTMCNC: 30.7 G/DL (ref 31–37)
MCV RBC AUTO: 76.8 FL (ref 80–100)
MONOCYTES # BLD AUTO: 1.09 X10(3) UL (ref 0.1–1)
MONOCYTES NFR BLD AUTO: 11.2 %
NEUTROPHILS # BLD AUTO: 5.99 X10 (3) UL (ref 1.5–7.7)
NEUTROPHILS # BLD AUTO: 5.99 X10(3) UL (ref 1.5–7.7)
NEUTROPHILS NFR BLD AUTO: 61.8 %
OSMOLALITY SERPL CALC.SUM OF ELEC: 302 MOSM/KG (ref 275–295)
PLATELET # BLD AUTO: 264 10(3)UL (ref 150–450)
POTASSIUM SERPL-SCNC: 3.3 MMOL/L (ref 3.5–5.1)
RBC # BLD AUTO: 4.79 X10(6)UL (ref 3.8–5.3)
SODIUM SERPL-SCNC: 145 MMOL/L (ref 136–145)
TROPONIN I SERPL HS-MCNC: 55 NG/L (ref ?–34)
WBC # BLD AUTO: 9.7 X10(3) UL (ref 4–11)

## 2025-06-05 PROCEDURE — 97161 PT EVAL LOW COMPLEX 20 MIN: CPT

## 2025-06-05 PROCEDURE — 97535 SELF CARE MNGMENT TRAINING: CPT

## 2025-06-05 PROCEDURE — 84484 ASSAY OF TROPONIN QUANT: CPT | Performed by: INTERNAL MEDICINE

## 2025-06-05 PROCEDURE — 85025 COMPLETE CBC W/AUTO DIFF WBC: CPT | Performed by: INTERNAL MEDICINE

## 2025-06-05 PROCEDURE — 93306 TTE W/DOPPLER COMPLETE: CPT | Performed by: INTERNAL MEDICINE

## 2025-06-05 PROCEDURE — 97530 THERAPEUTIC ACTIVITIES: CPT

## 2025-06-05 PROCEDURE — 83735 ASSAY OF MAGNESIUM: CPT | Performed by: INTERNAL MEDICINE

## 2025-06-05 PROCEDURE — 80048 BASIC METABOLIC PNL TOTAL CA: CPT | Performed by: INTERNAL MEDICINE

## 2025-06-05 PROCEDURE — 97165 OT EVAL LOW COMPLEX 30 MIN: CPT

## 2025-06-05 RX ORDER — MAGNESIUM SULFATE HEPTAHYDRATE 40 MG/ML
2 INJECTION, SOLUTION INTRAVENOUS ONCE
Status: DISCONTINUED | OUTPATIENT
Start: 2025-06-05 | End: 2025-06-05

## 2025-06-05 RX ORDER — DILTIAZEM HYDROCHLORIDE 30 MG/1
90 TABLET, FILM COATED ORAL EVERY 8 HOURS SCHEDULED
Status: DISCONTINUED | OUTPATIENT
Start: 2025-06-05 | End: 2025-06-07

## 2025-06-05 RX ORDER — MAGNESIUM OXIDE 400 MG/1
400 TABLET ORAL ONCE
Status: COMPLETED | OUTPATIENT
Start: 2025-06-05 | End: 2025-06-05

## 2025-06-05 RX ORDER — POTASSIUM CHLORIDE 1500 MG/1
20 TABLET, EXTENDED RELEASE ORAL DAILY
Status: DISCONTINUED | OUTPATIENT
Start: 2025-06-05 | End: 2025-06-10

## 2025-06-05 RX ORDER — LEVOTHYROXINE SODIUM 125 UG/1
125 TABLET ORAL
Status: DISCONTINUED | OUTPATIENT
Start: 2025-06-06 | End: 2025-06-10

## 2025-06-05 RX ORDER — FUROSEMIDE 10 MG/ML
40 INJECTION INTRAMUSCULAR; INTRAVENOUS
Status: COMPLETED | OUTPATIENT
Start: 2025-06-05 | End: 2025-06-06

## 2025-06-05 NOTE — DISCHARGE INSTRUCTIONS
Going Home Instructions  In this section you will find the tools which will guide you through the first few days after you leave the hospital. Continued use of these tools will help you develop the skills necessary to keep your heart failure under control.     Home Care Instructions Following Heart Failure - the most important things to do every day include:   Weigh yourself and review the “Self-Check Plan” sheet every morning.   Call your cardiologist office if you are in the “Pay Attention-Use Caution” (yellow zone) or “Medical Alert-Warning!” (red zone) as outlined in the Self-Check Plan sheet.  Take your medicines as prescribed.  Limit your sodium (salt) intake.  Know when to call your cardiologist, primary doctor, or nurse.  Know when to seek emergency care.      Things for You to Remember:   1. See your doctor or healthcare provider as written on your discharge instructions.  It is important that you attend this appointment to make sure your symptoms are under control.     2. Your recommended sodium intake is 8701-2470 mg daily.    3.  Weigh yourself every day.    4. Some exercise and activity is important to help keep your heart functioning and strong. Unless instructed not to exercise, you may walk at a slow to moderate pace for 10-15 minutes 2-3 days per week to start. Pace your activity to prevent shortness of breath or fatigue. Stop exercising if you develop chest pain, lightheadedness, or significant shortness of breath.       Call Your Cardiologist If:   You gain 2-3 pounds in one day or 5 pounds in one week.  You have more difficulty breathing.  You are getting more tired with normal activity.  You are more short of breath lying down, or awaken at night short of breath.  You have swelling of your feet or legs.  You urinate less often during the day and more often at night.  You have cramps in your legs.  You have blurred vision or see yellowish-green halos around objects of lights.    Go to the  Emergency Room If:   You have pain or tightness in your chest  You are extremely short of breath  You are coughing up pink-frothy mucus  You are traveling and develop symptoms of worsening heart failure      ** Please follow up with your cardiologist or Advanced Practice Provider as written on your discharge instructions. If you are not provided with an appointment, let your nurse know so you can get an appointment**      decreased synthroid to 125mcg; re-check studies in 4-6 weeks     Sometimes managing your health at home requires assistance.  The [facility] team has recognized your preference to use [facility].  They can be reached by phone at [phone].  The fax number for your reference is [phone].  A representative from the home health agency will contact you or your family to schedule your first visit.

## 2025-06-05 NOTE — PHYSICAL THERAPY NOTE
PHYSICAL THERAPY EVALUATION - INPATIENT     Room Number: 3614/3614-A  Evaluation Date: 6/5/2025  Type of Evaluation: Initial  Physician Order: PT Eval and Treat    Presenting Problem: worsening dyspnea  Co-Morbidities : A fib, Heart failure w/ reduced EF, HTN  Reason for Therapy: Mobility Dysfunction and Discharge Planning    PHYSICAL THERAPY ASSESSMENT   Patient is a 79 year old female admitted 6/4/2025 for worsening dyspnea. Prior to admission, patient's baseline is IND.  Patient is currently functioning below baseline with transfers, gait, and stair negotiation.  Patient is requiring contact guard assist as a result of the following impairments: medical status.  Physical Therapy will continue to follow for duration of hospitalization.    Patient will benefit from continued skilled PT Services for duration of hospitalization, however, given the patient is functioning near baseline level do not anticipate skilled therapy needs at discharge .    PLAN DURING HOSPITALIZATION  Nursing Mobility Recommendation : 1 Assist  PT Device Recommendation: Rolling walker  PT Treatment Plan: Stair training, Gait training, Energy conservation, Transfer training  Rehab Potential : Good  Frequency (Obs):  (2-3x / wk)     CURRENT GOALS    Goal #1 Patient is able to demonstrate transfers Sit to/from Stand at assistance level: supervision     Goal #2 Patient is able to ambulate 150 feet with assist device: LRAD at assistance level: supervision     Goal #3 Patient is able to navigate 1 flight of stairs w/ assist device: LRAD at assistance level: supervision   Goal #4    Goal #5    Goal Comments: Goals established on 6/5/2025      PHYSICAL THERAPY MEDICAL/SOCIAL HISTORY  History related to current admission: Patient is a 79 year old female admitted on 6/4/2025 for worsening dyspnea.     HOME SITUATION  Type of Home: House  Home Layout: Two level  Stairs to Enter : 2 (2 JIMI from front door, 3 JIMI from garage)        Stairs to Bedroom: 12     Railing: Yes    Lives With: Spouse               Prior Level of [place]: IND w/ functional mobility and ADLs.    SUBJECTIVE  Pt is agreeable to PT. After bed mobility, blood was leaking from patient's IV line and nurses were notified. When patient is sitting at EOB, heart rate increases to 145 and patient puts hand and chest and says \"I can feel it.\" Once heart rate decreased, patient did not feel the heart rate. During the walk, pt felt nervous walking back to the chair.     OBJECTIVE  Precautions: Bed/chair alarm  Fall Risk: High fall risk    WEIGHT BEARING RESTRICTION     PAIN ASSESSMENT  Rating: Unable to rate          COGNITION  Arousal/Alertness:  appropriate responses to stimuli  Following Commands:  follows one step commands without difficulty    RANGE OF MOTION AND STRENGTH ASSESSMENT  Upper extremity ROM and strength are within functional limits     Lower extremity ROM is within functional limits     Lower extremity strength is within functional limits     BALANCE  Static Sitting: Good  Dynamic Sitting: Good  Static Standing: Fair +  Dynamic Standing: Fair +    ADDITIONAL TESTS                                    ACTIVITY TOLERANCE  Pulse: (!) 145  Heart Rate Source: Monitor  Resp: (!) 33                O2 WALK  Oxygen Therapy  SPO2% on Room Air at Rest: 91    NEUROLOGICAL FINDINGS                        AM-PAC '6-Clicks' INPATIENT SHORT FORM - BASIC MOBILITY  How much difficulty does the patient currently have...  Patient Difficulty: Turning over in bed (including adjusting bedclothes, sheets and blankets)?: None   Patient Difficulty: Sitting down on and standing up from a chair with arms (e.g., wheelchair, bedside commode, etc.): A Little   Patient Difficulty: Moving from lying on back to sitting on the side of the bed?: A Little   How much help from another person does the patient currently need...   Help from Another: Moving to and from a bed to a chair (including a wheelchair)?: A Little   Help  from Another: Need to walk in hospital room?: A Little   Help from Another: Climbing 3-5 steps with a railing?: A Little     AM-PAC Score:  Raw Score: 19   Approx Degree of Impairment: 41.77%   Standardized Score (AM-PAC Scale): 45.44   CMS Modifier (G-Code): CK    FUNCTIONAL ABILITY STATUS  Gait Assessment   Functional Mobility/Gait Assessment  Gait Assistance: Contact guard assist  Distance (ft): 20  Assistive Device: None  Pattern: Shuffle    Skilled Therapy Provided     Bed Mobility:  Rolling: NT  Supine to sit: SUP   Sit to supine: NT     Transfer Mobility:  Sit to stand: CGA   Stand to sit: CGA  Gait = CGA for 20 feet w/ no assistive device    Therapist's Comments: Nurse notified that patient was bleeding from IV line. Able to perform static sitting balance at EOB for 5 minutes. HR increases up to 145 bpm moving from supine-to-sit. After HR reached around 110-120, pt able to perform sit<>stand transfers and ambulate for 20 feet.    Exercise/Education Provided:  Functional activity tolerated    Patient End of Session: Up in chair, Needs met, Call light within reach, RN aware of session/findings, All patient questions and concerns addressed, Alarm set, Family present      Patient Evaluation Complexity Level:  History Moderate - 1 or 2 personal factors and/or co-morbidities   Examination of body systems Low -  addressing 1-2 elements   Clinical Presentation Low- Stable   Clinical Decision Making Low Complexity       PT Session Time: 20 minutes  Therapeutic Activity: 15 minutes

## 2025-06-05 NOTE — PLAN OF CARE
Heart Failure Nurse  Progress Note    Patient was evaluated by the Heart Failure Nurse  for understanding, verbalization, demonstration, and recall of education related to heart failure, overall adherence to the behaviors necessary to maintain a compensated status, and risk for readmission.     Patient assessment:Patient is alert and oriented ×4. Spouse present at bedside. History of atrial fibrillation. Patient reports intermittent awareness of AFib. Recently experienced elevated heart rate, activity intolerance, shortness of breath, fatigue, and lower extremity swelling, prompting ED visit.    Extensive heart failure education provided. Patient reports frequent consumption of Gatorade and other electrolyte-replacement drinks; HF diet reviewed, and dietitian consult placed per patient request.    Plan: optimize rate control and repeat echocardiogram during hospitalization    Patient is able to verbalize signs/symptoms fluid overload/impending HF exacerbation and who to contact with problems                                          _x__ yes  ___ no      Patient is following a 2000 mg sodium diet                                             ___ yes  _x__ no    If no, barriers to 2000 mg sodium diet:new education     Patient informed of 2-Part dietician classes that is free if sign up within 30 days of discharge or $40  __x_ yes  ___ no      Patient is adherent to medication regimen                                              __x_ yes  ___ no    If no, barriers to medication regimen:    Patient has sufficient funds to purchase medication                      _x__ yes  ___ no      Patient has a scale in the home              __x_ yes  ___ no      Patient is adherent to daily weight monitoring                                        ___ yes   _x__ no    If no, barriers to daily weight monitoring: new education    Symptom Tracker Worksheet reviewed with patient  _x__ yes   ___ no      Patient verbalizes  understanding of stoplight/heart failure zones          _x__ yes   ___ no      Patient understands the importance of 7-day follow-up appointment      x___ yes  ___ no    Appointment Date: patient will request           Patient has adequate transportation to attend follow-up appointments    _x__ yes  ___ no    If no, was referral to Social Work made  ___yes  _x__ no      Family/Friend present during education: spouse    Additional consultations required: dietician per patient request     Kelli OLVERAN, RN, PCCN  HF  [id number]

## 2025-06-05 NOTE — OCCUPATIONAL THERAPY NOTE
OCCUPATIONAL THERAPY EVALUATION - INPATIENT     Room Number: 3614/3614-A  Evaluation Date: 6/5/2025  Type of Evaluation: Initial  Presenting Problem: shortness of breath, CHF    Physician Order: IP Consult to Occupational Therapy  Reason for Therapy: ADL/IADL Dysfunction and Discharge Planning    OCCUPATIONAL THERAPY ASSESSMENT   Patient is currently functioning near baseline with toileting, bathing, lower body dressing, and transfers. Prior to admission, patient's baseline is independent.  Patient is requiring supervision, CGA as a result of the following impairments: decreased endurance and impaired standing balance. Occupational Therapy will continue to follow for duration of hospitalization.    Patient will benefit from continued skilled OT Services for duration of hospitalization, however, given the patient is functioning near baseline level do not anticipate skilled therapy needs at discharge     History Related to Current Admission: Patient is a 79 year old female admitted on 6/4/2025 with Presenting Problem: shortness of breath, CHF. Co-Morbidities : A fib, Heart failure w/ reduced EF, HTN    EVALUATION SESSION:  Patient Start of Session: supine    FUNCTIONAL TRANSFER ASSESSMENT  Sit to Stand: Edge of Bed  Edge of Bed: Contact Guard Assist    BED MOBILITY  Supine to Sit : Supervision     FUNCTIONAL ADL ASSESSMENT  UB Dressing Seated: Stand-by Assist (gown changes)    O2 SATURATIONS  Oxygen Therapy  SPO2% on Room Air at Rest: 92    COGNITION  Overall Cognitive Status:  WFL - within functional limits    Upper Extremity   ROM: within functional limits   Strength: within functional limits     EDUCATION PROVIDED  Patient Education : Role of Occupational Therapy; Plan of Care; Energy Conservation  Patient's Response to Education: Requires Further Education       Therapist comments:  present during the session.  Resting -112.  Supine to sit supervision.  Once seated, -133.  Noted bloody sheets,  gown, and down her L arm by the IV.   Called RN.  After RN applied dressing on IV site, stood with cga.  Pt reporting \"I feel that now,\" about palpitations.  -149 as pt started to take steps to room door and back to the chair. Assisted with gown changes, seated, set-up level.  RN aware about A-fib.   Initiated pt education about energy conservation principles.       Patient End of Session: Up in chair, Needs met, Call light within reach, RN aware of session/findings, All patient questions and concerns addressed, Family present    OCCUPATIONAL PROFILE    HOME SITUATION  Type of Home: House  Home Layout: Two level  Lives With: Spouse    Toilet and Equipment: Standard height toilet  Shower/Tub and Equipment: Walk-in shower       Occupation/Status: retired     Drives: Yes       Prior Level of Function: independent with ADL and IADL. No device.  performs household tasks.  Pt enjoys gardening.    PAIN ASSESSMENT  Ratin          OBJECTIVE  Precautions: Bed/chair alarm  Fall Risk: High fall risk    ASSESSMENTS    AM-PAC ‘6-Clicks’ Inpatient Daily Activity Short Form  -   Putting on and taking off regular lower body clothing?: A Little  -   Bathing (including washing, rinsing, drying)?: A Little  -   Toileting, which includes using toilet, bedpan or urinal? : A Little  -   Putting on and taking off regular upper body clothing?: None  -   Taking care of personal grooming such as brushing teeth?: None  -   Eating meals?: None    AM-PAC Score:  Score: 21  Approx Degree of Impairment: 32.79%  Standardized Score (AM-PAC Scale): 44.27     PLAN     OT Treatment Plan: Energy conservation/work simplification techniques, ADL training, Patient/Family education, Compensatory technique education  Rehab Potential : Good  Frequency: 3x/week  Number of Visits to Meet Established Goals: 3    ADL Goals   Patient will perform lower body dressing:  with supervision  Patient will perform toileting: with supervision    Functional  Transfer Goals  Patient will transfer to toilet:  with supervision    UE Exercise Program Goal  Patient will be independent with bilateral AROM HEP (home exercise program).    Additional Goals  Pt will incorporate 2 energy conservation techniques into ADL.    Patient Evaluation Complexity Level:   Occupational Profile/Medical History LOW - Brief history including review of medical or therapy records    Specific performance deficits impacting engagement in ADL/IADL LOW  1 - 3 performance deficits    Client Assessment/Performance Deficits MODERATE - Comorbidities and min to mod modifications of tasks    Clinical Decision Making LOW - Analysis of occupational profile, problem-focused assessments, limited treatment options    Overall Complexity LOW     OT Session Time: 20 minutes  Self-Care Home Management: 8 minutes

## 2025-06-05 NOTE — PROGRESS NOTES
[facility] Hospitalist Progress Note     PCP: Roby Llamas MD    Chief Complaint: follow-up   Follow up for: The encounter diagnosis was Acute congestive heart failure, unspecified heart failure type (HCC).    Overnight/Interim Events:      SUBJECTIVE:  No cp/weakness. Little sob, some palpitations c PT. +u/o     OBJECTIVE:  Temp:  [97.9 °F (36.6 °C)-98.4 °F (36.9 °C)] 98.4 °F (36.9 °C)  Pulse:  [] 91  Resp:  [15-33] 20  BP: ()/(59-80) 99/80  SpO2:  [91 %-95 %] 93 %    Intake/Output:    Intake/Output Summary (Last 24 hours) at 6/5/2025 1757  Last data filed at 6/4/2025 2330  Gross per 24 hour   Intake --   Output 1000 ml   Net -1000 ml       Last 3 Weights   06/05/25 0131 229 lb 0.9 oz (103.9 kg)   01/26/23 1523 227 lb 11.8 oz (103.3 kg)   01/26/23 1039 225 lb (102.1 kg)   04/04/22 1021 220 lb (99.8 kg)       Exam    General: Alert, no distress, appears stated age.     Head:  Normocephalic, without obvious abnormality, atraumatic.   Eyes:  Sclera anicteric, EOMs intact.    Nose: Nares normal,  Mucosa normal    Throat: Lips normal   Neck: Supple, symmetrical, trachea midline   Lungs:   Clear to auscultation bilaterally. Normal effort   Chest wall:  No tenderness or deformity   Heart:  irregular rate and rhythm, S1, S2 normal, no murmur, rub or gallop appreciated   Abdomen:   Soft, NT/ND, Bowel sounds normal. No masses,  No organomegaly.    Extremities: Extremities normal, atraumatic, no cyanosis. 2+ LE edema.   Skin: Skin color, texture, turgor normal. No rashes or lesions.    Neurologic: Moving all extremities spontaneously, no focal deficit appreciated      Data Review:       Labs:     Recent Labs   Lab 06/04/25  1359 06/05/25  0734   WBC 12.9* 9.7   HGB 12.6 11.3*   MCV 77.0* 76.8*   .0 264.0       Recent Labs   Lab 06/04/25  1359 06/05/25  0734    145   K 4.1 3.3*    108   CO2 26.0 28.0   BUN 19 19   CREATSERUM 1.26* 1.09*   CA 8.9 8.2*   MG  --  1.7   * 93       Recent Labs    Lab 06/04/25  1359   ALT 31   AST 36*   ALB 4.2       No results for input(s): \"PGLU\" in the last 168 hours.    No results for input(s): \"TROP\" in the last 168 hours.      Meds:     Scheduled Medications[1]  Medication Infusions[2]  PRN Medications[3]       Assessment/Plan:     79 year old female with PMH including but not limited to thyroid ca, HT, HTN, afib, HFpEF who p/t [facility] ED c SOB x couple months.       Acute HFpEF   - tele  - cards following, apprec  - IV lasix 40mg BID   - echo EF 40%, mild-mod diffuse hypokineses   - cont home meds  - daily I/Os, wts     A. fib with RVR   -Cardiology recommendations; IV cardizem  -xarelto   -[id number] =3     Hypothyroidism  -Resume home Synthroid   -free T4 high and TSH slightly low, possibly contributing to above   - decrease to 125mcg and re-check studies in 6 weeks     Hypertension  -Resume home antihypertensives as HR allows      Hypocalcemia  - follow     #hypomagnesium  -replete per protocol      Morbid obesity BMI >40  -Outpatient follow-up on resolution of above issues     # hypokalemia  -replete per protocol       Dispo: CTU    Questions/concerns were discussed with patient and  by bedside. D/w RN     Jim Campos MD  [facility] [place]  [phone]  6/5/2025  5:57 PM    Supplementary Documentation:   DVT Mechanical Prophylaxis:   SCDs,    DVT Pharmacologic Prophylaxis   Medication    rivaroxaban (Xarelto) tab 20 mg                Code Status: Prior  Buchanan: No urinary catheter in place  Buchanan Duration (in days):   Central line:    MENDY:         **Certification      PHYSICIAN Certification of Need for Inpatient Hospitalization - Initial Certification    Patient will require inpatient services that will reasonably be expected to span two midnight's based on the clinical documentation in H+P.   Based on patients current state of illness, I anticipate that, after discharge, patient will require TBD.                  [1]    dilTIAZem  90 mg Oral Q8H ZOË     furosemide  40 mg Intravenous BID (Diuretic)    potassium chloride  20 mEq Oral Daily    spironolactone  12.5 mg Oral Daily    rivaroxaban  20 mg Oral Daily with food    metoprolol succinate  50 mg Oral 2x Daily(Beta Blocker)    levothyroxine  150 mcg Oral Before breakfast   [2] [3]   pantoprazole

## 2025-06-05 NOTE — PLAN OF CARE
Received pt in her bed alert and coherent with  having dinner. She is afib in her tele. RA. Vss. Dilt cont. At 2.5mg/hr. For echo in the morning. IV lasix. Ambulatory. All goals progressing. Will cont poc.       Problem: CARDIOVASCULAR - ADULT  Goal: Maintains optimal cardiac output and hemodynamic stability  Description: INTERVENTIONS:  - Monitor vital signs, rhythm, and trends  - Monitor for bleeding, hypotension and signs of decreased cardiac output  - Evaluate effectiveness of vasoactive medications to optimize hemodynamic stability  - Monitor arterial and/or venous puncture sites for bleeding and/or hematoma  - Assess quality of pulses, skin color and temperature  - Assess for signs of decreased coronary artery perfusion - ex. Angina  - Evaluate fluid balance, assess for edema, trend weights  Outcome: Progressing  Goal: Absence of cardiac arrhythmias or at baseline  Description: INTERVENTIONS:  - Continuous cardiac monitoring, monitor vital signs, obtain 12 lead EKG if indicated  - Evaluate effectiveness of antiarrhythmic and heart rate control medications as ordered  - Initiate emergency measures for life threatening arrhythmias  - Monitor electrolytes and administer replacement therapy as ordered  Outcome: Progressing     Problem: METABOLIC/FLUID AND ELECTROLYTES - ADULT  Goal: Electrolytes maintained within normal limits  Description: INTERVENTIONS:  - Monitor labs and rhythm and assess patient for signs and symptoms of electrolyte imbalances  - Administer electrolyte replacement as ordered  - Monitor response to electrolyte replacements, including rhythm and repeat lab results as appropriate  - Fluid restriction as ordered  - Instruct patient on fluid and nutrition restrictions as appropriate  Outcome: Progressing  Goal: Hemodynamic stability and optimal renal function maintained  Description: INTERVENTIONS:  - Monitor labs and assess for signs and symptoms of volume excess or deficit  - Monitor  intake, output and patient weight  - Monitor urine specific gravity, serum osmolarity and serum sodium as indicated or ordered  - Monitor response to interventions for patient's volume status, including labs, urine output, blood pressure (other measures as available)  - Encourage oral intake as appropriate  - Instruct patient on fluid and nutrition restrictions as appropriate  Outcome: Progressing

## 2025-06-05 NOTE — PROGRESS NOTES
[facility] Cardiology Progress Note        Sariah Montague Patient Status:  Inpatient    10/18/1945 MRN [id number]   Location [facility] 3NE-A Attending Jim Campos MD   Hosp Day # 1 PCP Roby Llamas MD     Subjective:  The patient denies  chest pain   Mild SOB but improved  Orthopnea has improved as well    Medications:  Scheduled Medications[1]    Continuous Infusions:  Medication Infusions[2]      Allergies:  Allergies[3]      Objective:        Intake/Output:      Intake/Output Summary (Last 24 hours) at 2025 1307  Last data filed at 2025 2330  Gross per 24 hour   Intake --   Output 1000 ml   Net -1000 ml     Wt Readings from Last 3 Encounters:   25 229 lb 0.9 oz (103.9 kg)   23 227 lb 11.8 oz (103.3 kg)   22 220 lb (99.8 kg)       Physical Exam:        Vitals:    25 0131 25 0530 25 0900 25 1219   BP:  105/69  100/79   BP Location:  Right arm  Right arm   Pulse:  98 (!) 145 88   Resp:  15 (!) 33 20   Temp:  98.2 °F (36.8 °C)  97.9 °F (36.6 °C)   TempSrc:  Oral  Oral   SpO2:  93%  95%   Weight: 229 lb 0.9 oz (103.9 kg)          Temp:  [97.9 °F (36.6 °C)-98.5 °F (36.9 °C)] 97.9 °F (36.6 °C)  Pulse:  [] 88  Resp:  [15-33] 20  BP: ()/(53-86) 100/79  SpO2:  [91 %-100 %] 95 %      Temp: 97.9 °F (36.6 °C)  Pulse: 88  Resp: 20  BP: 100/79  General:  Appears comfortable  HEENT: No focal deficits.  Neck: No JVD, carotids 2+ no bruits.  Cardiac: Irregular S1S2.  No S3, S4, rub, click.  No murmur.  Lungs: Clear to auscultation and percussion.  Abdomen: Soft, non-tender.   Extremities: 2 + bilateral LE edema.  No clubbing or cyanosis.    Neurologic: Alert and oriented, normal affect.  Skin: Warm and dry.           LABS:      HEM:  Recent Labs   Lab 25  1359 25  0734   WBC 12.9* 9.7   HGB 12.6 11.3*   HCT 41.6 36.8   .0 264.0       Chem:  Recent Labs   Lab 25  1359 25  0734    145   K  4.1 3.3*    108   CO2 26.0 28.0   BUN 19 19   CREATSERUM 1.26* 1.09*   CA 8.9 8.2*   MG  --  1.7   * 93       Recent Labs   Lab 06/04/25  1359   ALT 31   AST 36*   ALB 4.2       No results for input(s): \"PT\", \"PTT\", \"INR\" in the last 168 hours.        Lab Results   Component Value Date    TROP 0.147 () 01/11/2017    TROP 0.101 () 11/04/2016    TROP 0.184 () 02/05/2016         Invalid input(s): \"PBNPML\"                       Diagnostics:   Telemetry: Atrial Fibrillation /flutter, 100-130    EKG, 6/5/2025,            Laboratories and Data:  Diagnostics:     EKG, 6/4/2025:  atrial flutter, 137/min, NSTW changes     CXR, 6/4/2025:       CONCLUSION:  Cardiomegaly.  There is pulmonary venous congestion.      There is bibasilar airspace disease with bilateral pleural effusions right greater than left.      Findings consistent with CHF.  Superimposed bibasilar pneumonia not to be excluded.             Impression:     1.  Acute HFpEF     -net out = 1.0 L    2. Paroxysmal Atrial Fibrillation/flutter.  RVR  (which may have triggered #1). HR's somewhat improved  3. Hypertension   4. obesity        Recommend:     1.  Telemetry  2. IV lasix 40 mg bid  3. HR control: transition to po cardizem  4. Continue op CHF meds, DOAC  5. Echo today  6. Replace K+          Nigel Hagan MD  6/5/2025  1:07 PM           [1]    spironolactone  12.5 mg Oral Daily    rivaroxaban  20 mg Oral Daily with food    metoprolol succinate  50 mg Oral 2x Daily(Beta Blocker)    levothyroxine  150 mcg Oral Before breakfast   [2]    dilTIAZem 5 mg/hr (06/05/25 0539)   [3]   Allergies  Allergen Reactions    Adhesive Tape RASH

## 2025-06-05 NOTE — PROGRESS NOTES
Sepsis alert was flagged with the pt d/t to increase respirations of 31. Re take vitals - wnl. RR - 21. She just finished her dinner, watching tv with . MD made aware

## 2025-06-06 LAB
ANION GAP SERPL CALC-SCNC: 9 MMOL/L (ref 0–18)
BUN BLD-MCNC: 18 MG/DL (ref 9–23)
CALCIUM BLD-MCNC: 8.5 MG/DL (ref 8.7–10.6)
CHLORIDE SERPL-SCNC: 107 MMOL/L (ref 98–112)
CO2 SERPL-SCNC: 28 MMOL/L (ref 21–32)
CREAT BLD-MCNC: 1.02 MG/DL (ref 0.55–1.02)
EGFRCR SERPLBLD CKD-EPI 2021: 56 ML/MIN/1.73M2 (ref 60–?)
ERYTHROCYTE [DISTWIDTH] IN BLOOD BY AUTOMATED COUNT: 17.8 %
GLUCOSE BLD-MCNC: 104 MG/DL (ref 70–99)
HCT VFR BLD AUTO: 38.2 % (ref 35–48)
HGB BLD-MCNC: 11.6 G/DL (ref 12–16)
MAGNESIUM SERPL-MCNC: 1.8 MG/DL (ref 1.6–2.6)
MCH RBC QN AUTO: 23.6 PG (ref 26–34)
MCHC RBC AUTO-ENTMCNC: 30.4 G/DL (ref 31–37)
MCV RBC AUTO: 77.6 FL (ref 80–100)
OSMOLALITY SERPL CALC.SUM OF ELEC: 300 MOSM/KG (ref 275–295)
PLATELET # BLD AUTO: 267 10(3)UL (ref 150–450)
POTASSIUM SERPL-SCNC: 3.6 MMOL/L (ref 3.5–5.1)
RBC # BLD AUTO: 4.92 X10(6)UL (ref 3.8–5.3)
SODIUM SERPL-SCNC: 144 MMOL/L (ref 136–145)
WBC # BLD AUTO: 10.5 X10(3) UL (ref 4–11)

## 2025-06-06 PROCEDURE — 80048 BASIC METABOLIC PNL TOTAL CA: CPT | Performed by: INTERNAL MEDICINE

## 2025-06-06 PROCEDURE — 85027 COMPLETE CBC AUTOMATED: CPT | Performed by: INTERNAL MEDICINE

## 2025-06-06 PROCEDURE — 83735 ASSAY OF MAGNESIUM: CPT | Performed by: INTERNAL MEDICINE

## 2025-06-06 RX ORDER — LOSARTAN POTASSIUM 25 MG/1
25 TABLET ORAL DAILY
Status: DISCONTINUED | OUTPATIENT
Start: 2025-06-06 | End: 2025-06-10

## 2025-06-06 RX ORDER — DIGOXIN 125 MCG
125 TABLET ORAL DAILY
Status: DISCONTINUED | OUTPATIENT
Start: 2025-06-07 | End: 2025-06-10

## 2025-06-06 RX ORDER — DIGOXIN 0.25 MG/ML
250 INJECTION INTRAMUSCULAR; INTRAVENOUS EVERY 6 HOURS
Status: COMPLETED | OUTPATIENT
Start: 2025-06-06 | End: 2025-06-07

## 2025-06-06 NOTE — PLAN OF CARE
Assumed care around 1930  A&O x 4  Ra  Afib on tele  Pt denies pain   Purewick + brief in place  Cardiac diet  Piv l forearm cdi sl  Scheduled xarelto   Safety precautions in place, bed in lowest position, call light within reach, updated on poc, all needs met at this time.      Problem: CARDIOVASCULAR - ADULT  Goal: Maintains optimal cardiac output and hemodynamic stability  Description: INTERVENTIONS:  - Monitor vital signs, rhythm, and trends  - Monitor for bleeding, hypotension and signs of decreased cardiac output  - Evaluate effectiveness of vasoactive medications to optimize hemodynamic stability  - Monitor arterial and/or venous puncture sites for bleeding and/or hematoma  - Assess quality of pulses, skin color and temperature  - Assess for signs of decreased coronary artery perfusion - ex. Angina  - Evaluate fluid balance, assess for edema, trend weights  Outcome: Progressing  Goal: Absence of cardiac arrhythmias or at baseline  Description: INTERVENTIONS:  - Continuous cardiac monitoring, monitor vital signs, obtain 12 lead EKG if indicated  - Evaluate effectiveness of antiarrhythmic and heart rate control medications as ordered  - Initiate emergency measures for life threatening arrhythmias  - Monitor electrolytes and administer replacement therapy as ordered  Outcome: Progressing     Problem: METABOLIC/FLUID AND ELECTROLYTES - ADULT  Goal: Electrolytes maintained within normal limits  Description: INTERVENTIONS:  - Monitor labs and rhythm and assess patient for signs and symptoms of electrolyte imbalances  - Administer electrolyte replacement as ordered  - Monitor response to electrolyte replacements, including rhythm and repeat lab results as appropriate  - Fluid restriction as ordered  - Instruct patient on fluid and nutrition restrictions as appropriate  Outcome: Progressing  Goal: Hemodynamic stability and optimal renal function maintained  Description: INTERVENTIONS:  - Monitor labs and assess for  signs and symptoms of volume excess or deficit  - Monitor intake, output and patient weight  - Monitor urine specific gravity, serum osmolarity and serum sodium as indicated or ordered  - Monitor response to interventions for patient's volume status, including labs, urine output, blood pressure (other measures as available)  - Encourage oral intake as appropriate  - Instruct patient on fluid and nutrition restrictions as appropriate  Outcome: Progressing

## 2025-06-06 NOTE — DIETARY NOTE
[facility]   part of [facility]   CLINICAL NUTRITION    Sariah Montague     Admitting diagnosis:  Acute congestive heart failure, unspecified heart failure type (HCC) [I50.9]    Ht:    Wt: 103.9 kg (229 lb 0.9 oz).   Body mass index is 40.58 kg/m².  IBW: 52 kg    Wt Readings from Last 6 Encounters:   06/05/25 103.9 kg (229 lb 0.9 oz)   01/26/23 103.3 kg (227 lb 11.8 oz)   04/04/22 99.8 kg (220 lb)   06/23/21 103.4 kg (228 lb)   03/24/21 104.3 kg (230 lb)   06/18/20 95.3 kg (210 lb)        Labs/Meds reviewed    Diet:       Procedures    Cardiac diet Cardiac; Is Patient on Accuchecks? No     Percent Meals Eaten (last 3 days)       None              Pt chart reviewed d/t heart failure consult Pt was given handout for low sodium diet. Pt was with  and both were receptive to education and asks multiple questions. Pt understood education and showed willingness to adhere to diet.  Patient reports good appetite at this time.  Tolerating po diet without diarrhea, emesis, or constipation.   No significant weight changes noted.     Patient is at low nutrition risk at this time.    Please consult if patient status changes or nutrition issues arise.    Narciso Carver RD, LDN  Clinical Nutrition   Available via Epic secure chat

## 2025-06-06 NOTE — PROGRESS NOTES
Assumed care at 7:30am  Alert and oriented x4.    at bedside.   Denies pain  Gets a little SOB when talking or swallowing pills/food.   All safety precautions in place. Will continue to monitor patient.    HR also goes up to 130s-150s and will sustain for a little while, then goes back down to 100s.   PO cardizem given an hour early.   Paged Cardiology.     Cardiology added IV digoxin, first dose given, tolerated well.

## 2025-06-06 NOTE — PROGRESS NOTES
[facility] Cardiology Progress Note        Sariah Montague Patient Status:  Inpatient    10/18/1945 MRN [id number]   Location [facility] 3NE-A Attending Jim Campos MD   Hosp Day # 2 PCP Roby Llamas MD     Subjective:  The patient denies  chest pain   Dyspnea was worse in last 12 hours. Still notes orthopnea.      Medications:  Scheduled Medications[1]    Continuous Infusions:  Medication Infusions[2]      Allergies:  Allergies[3]      Objective:        Intake/Output:      Intake/Output Summary (Last 24 hours) at 2025 0817  Last data filed at 2025 0400  Gross per 24 hour   Intake --   Output 1600 ml   Net -1600 ml     Wt Readings from Last 3 Encounters:   25 229 lb 0.9 oz (103.9 kg)   23 227 lb 11.8 oz (103.3 kg)   22 220 lb (99.8 kg)       Physical Exam:        Vitals:    25 1716 25 2000 25 0000 25 0400   BP: 99/80 105/68 100/76 98/61   BP Location: Right arm Right arm Right arm Right arm   Pulse: 91 119 73 99   Resp: 20 25 (!) 27 17   Temp: 98.4 °F (36.9 °C) 98.5 °F (36.9 °C) 98 °F (36.7 °C) 98 °F (36.7 °C)   TempSrc: Oral Oral Oral Oral   SpO2: 93% 92% 93% (!) 3%   Weight:           Temp:  [97.9 °F (36.6 °C)-98.5 °F (36.9 °C)] 98 °F (36.7 °C)  Pulse:  [] 99  Resp:  [17-33] 17  BP: ()/(61-80) 98/61  SpO2:  [3 %-95 %] 3 %      Temp: 98 °F (36.7 °C)  Pulse: 99  Resp: 17  BP: 98/61  General:  Appears comfortable  HEENT: No focal deficits.  Neck: No JVD, carotids 2+ no bruits.  Cardiac: Irregular S1S2.  No S3, S4, rub, click.  No murmur.  Lungs: Clear to auscultation and percussion.  Abdomen: Soft, non-tender.   Extremities: 1 + bilateral LE edema.  No clubbing or cyanosis.    Neurologic: Alert and oriented, normal affect.  Skin: Warm and dry.           LABS:      HEM:  Recent Labs   Lab 25  1359 25  0734 25  0657   WBC 12.9* 9.7 10.5   HGB 12.6 11.3* 11.6*   HCT 41.6 36.8 38.2   .0 264.0  267.0       Chem:  Recent Labs   Lab 06/04/25  1359 06/05/25  0734 06/06/25  0657    145 144   K 4.1 3.3* 3.6    108 107   CO2 26.0 28.0 28.0   BUN 19 19 18   CREATSERUM 1.26* 1.09* 1.02   CA 8.9 8.2* 8.5*   MG  --  1.7 1.8   * 93 104*       Recent Labs   Lab 06/04/25  1359   ALT 31   AST 36*   ALB 4.2       No results for input(s): \"PT\", \"PTT\", \"INR\" in the last 168 hours.        Lab Results   Component Value Date    TROP 0.147 () 01/11/2017    TROP 0.101 () 11/04/2016    TROP 0.184 () 02/05/2016         Invalid input(s): \"PBNPML\"                       Diagnostics:   Telemetry: Atrial Fibrillation /flutter, 100    EKG, 6/5/2025,     Echo, 6/5/25:    1. Left ventricle: The cavity size was normal. Wall thickness was normal.      Systolic function was mildly to moderately reduced. The estimated      ejection fraction was 40%, by visual assessment. There was mild -      moderate diffuse hypokinesis with no identifiable regional variations.      Unable to assess LV diastolic function due to heart rhythm.   2. Right ventricle: The cavity size was moderately increased. Systolic      function was normal.   3. Left atrium: The left atrial volume was moderately increased.   4. Right atrium: The atrium was moderately dilated.   5. Mitral valve: The annulus was mildly to moderately calcified. Systolic      bowing without prolapse, involving the anterior leaflet and the posterior      leaflet. There was mild to moderate regurgitation.   6. Tricuspid valve: There was severe regurgitation.   7. Pulmonary arteries: Systolic pressure was mildly to moderately increased,      in the range of 45mm Hg to 50mm Hg.     *          Laboratories and Data:  Diagnostics:     EKG, 6/4/2025:  atrial flutter, 137/min, NSTW changes     CXR, 6/4/2025:       CONCLUSION:  Cardiomegaly.  There is pulmonary venous congestion.      There is bibasilar airspace disease with bilateral pleural effusions right greater than left.       Findings consistent with CHF.  Superimposed bibasilar pneumonia not to be excluded.             Impression:     1.  Acute HFrEF   -LVEF reduced at 40%   -moderate pulm Hypertension     -net out = 2.6 L    2. Paroxysmal Atrial Fibrillation/flutter.  RVR  (which may have triggered #1). HR's somewhat improved  3. Hypertension   4. obesity        Recommend:     1.  Telemetry  2. continue IV lasix 40 mg bid  3. HR control:  increase metoprolol dose to 75 mg bid  4. Continue op CHF meds, DOAC  5.  GDMT: add low dose losartan. Follow bp's, BMP's  6. Replace K+          Nigel Hagan MD             [1]    dilTIAZem  90 mg Oral Q8H ZOË    potassium chloride  20 mEq Oral Daily    levothyroxine  125 mcg Oral Before breakfast    spironolactone  12.5 mg Oral Daily    rivaroxaban  20 mg Oral Daily with food    metoprolol succinate  50 mg Oral 2x Daily(Beta Blocker)   [2] [3]   Allergies  Allergen Reactions    Adhesive Tape RASH

## 2025-06-06 NOTE — PROGRESS NOTES
[facility] Hospitalist Progress Note     PCP: Roby Llamas MD    Chief Complaint: follow-up   Follow up for: The encounter diagnosis was Acute congestive heart failure, unspecified heart failure type (HCC).    Overnight/Interim Events:      SUBJECTIVE:  Walked up to bathroom. Been in chair. HR goes up. Was SOB c that. OOB some sob. No cp. Palpitations earlier.     Little cough, no sputum. No fever. +u/o  OBJECTIVE:  Temp:  [98 °F (36.7 °C)-98.5 °F (36.9 °C)] 98.1 °F (36.7 °C)  Pulse:  [] 85  Resp:  [17-27] 18  BP: ()/(61-80) 105/78  SpO2:  [3 %-93 %] 90 %    Intake/Output:    Intake/Output Summary (Last 24 hours) at 6/6/2025 1432  Last data filed at 6/6/2025 0400  Gross per 24 hour   Intake --   Output 1600 ml   Net -1600 ml       Last 3 Weights   06/05/25 0131 229 lb 0.9 oz (103.9 kg)   01/26/23 1523 227 lb 11.8 oz (103.3 kg)   01/26/23 1039 225 lb (102.1 kg)   04/04/22 1021 220 lb (99.8 kg)       Exam    General: Alert, no distress, appears stated age.     Head:  Normocephalic, without obvious abnormality, atraumatic.   Eyes:  Sclera anicteric, EOMs intact.    Nose: Nares normal,  Mucosa normal    Throat: Lips normal   Neck: Supple, symmetrical, trachea midline   Lungs:   Clear to auscultation bilaterally. Normal effort   Chest wall:  No tenderness or deformity   Heart:  irregular rate and rhythm, S1, S2 normal, no murmur, rub or gallop appreciated   Abdomen:   Soft, NT/ND, Bowel sounds normal. No masses,  No organomegaly.    Extremities: Extremities normal, atraumatic, no cyanosis. 2+ LE edema.   Skin: Skin color, texture, turgor normal. No rashes or lesions.    Neurologic: Moving all extremities spontaneously, no focal deficit appreciated      Data Review:       Labs:     Recent Labs   Lab 06/04/25  1359 06/05/25  0734 06/06/25  0657   WBC 12.9* 9.7 10.5   HGB 12.6 11.3* 11.6*   MCV 77.0* 76.8* 77.6*   .0 264.0 267.0       Recent Labs   Lab 06/04/25  1359 06/05/25  0734 06/06/25  0657     145 144   K 4.1 3.3* 3.6    108 107   CO2 26.0 28.0 28.0   BUN 19 19 18   CREATSERUM 1.26* 1.09* 1.02   CA 8.9 8.2* 8.5*   MG  --  1.7 1.8   * 93 104*       Recent Labs   Lab 06/04/25  1359   ALT 31   AST 36*   ALB 4.2       No results for input(s): \"PGLU\" in the last 168 hours.    No results for input(s): \"TROP\" in the last 168 hours.      Meds:     Scheduled Medications[1]  Medication Infusions[2]  PRN Medications[3]       Assessment/Plan:     79 year old female with PMH including but not limited to thyroid ca, HT, HTN, afib, HFpEF who p/t [facility] ED c SOB x couple months.       Acute HFpEF   - tele  - cards following, apprec  - IV lasix 40mg BID   - echo EF 40%, mild-mod diffuse hypokineses   - cont home meds, added arb  - daily I/Os, wts  - consider ischemic w/u as appropriate per cards      JON  - 2/2 above, on RA  - supp o2 as needed, walking o2 assessment p/t dc       A. fib with RVR   -Cardiology recommendations; IV cardizem  -xarelto   -[id number] =3     Hypothyroidism  -Resume home Synthroid   -free T4 high and TSH slightly low, possibly contributing to above   - decreased to 125mcg and re-check studies in 6 weeks     Hypertension  -Resume home antihypertensives as HR allows      Hypocalcemia  - follow     #hypomagnesium  -replete per protocol      Morbid obesity BMI >40  -Outpatient follow-up on resolution of above issues     # hypokalemia  -replete per protocol      SLP     Dispo: CTU    Questions/concerns were discussed with patient and /dtr/gc by bedside. D/w RN     Jim Campos MD  [facility] Hospitalist  [phone]  6/5/2025  5:57 PM    Supplementary Documentation:   DVT Mechanical Prophylaxis:   SCDs,    DVT Pharmacologic Prophylaxis   Medication    rivaroxaban (Xarelto) tab 20 mg                Code Status: Prior  Buchanan: No urinary catheter in place  Buchanan Duration (in days):   Central line:    MENDY:         **Certification      PHYSICIAN Certification of Need for Inpatient  Hospitalization - Initial Certification    Patient will require inpatient services that will reasonably be expected to span two midnight's based on the clinical documentation in H+P.   Based on patients current state of illness, I anticipate that, after discharge, patient will require TBD.                  [1]    metoprolol succinate  75 mg Oral 2x Daily(Beta Blocker)    losartan  25 mg Oral Daily    dilTIAZem  90 mg Oral Q8H ZOË    potassium chloride  20 mEq Oral Daily    levothyroxine  125 mcg Oral Before breakfast    spironolactone  12.5 mg Oral Daily    rivaroxaban  20 mg Oral Daily with food   [2] [3]   pantoprazole

## 2025-06-07 LAB
ANION GAP SERPL CALC-SCNC: 7 MMOL/L (ref 0–18)
BUN BLD-MCNC: 18 MG/DL (ref 9–23)
CALCIUM BLD-MCNC: 8.8 MG/DL (ref 8.7–10.6)
CHLORIDE SERPL-SCNC: 106 MMOL/L (ref 98–112)
CO2 SERPL-SCNC: 29 MMOL/L (ref 21–32)
CREAT BLD-MCNC: 1.04 MG/DL (ref 0.55–1.02)
EGFRCR SERPLBLD CKD-EPI 2021: 55 ML/MIN/1.73M2 (ref 60–?)
GLUCOSE BLD-MCNC: 94 MG/DL (ref 70–99)
OSMOLALITY SERPL CALC.SUM OF ELEC: 296 MOSM/KG (ref 275–295)
POTASSIUM SERPL-SCNC: 4.2 MMOL/L (ref 3.5–5.1)
SODIUM SERPL-SCNC: 142 MMOL/L (ref 136–145)

## 2025-06-07 PROCEDURE — 97116 GAIT TRAINING THERAPY: CPT

## 2025-06-07 PROCEDURE — 92610 EVALUATE SWALLOWING FUNCTION: CPT

## 2025-06-07 PROCEDURE — 97530 THERAPEUTIC ACTIVITIES: CPT

## 2025-06-07 PROCEDURE — 80048 BASIC METABOLIC PNL TOTAL CA: CPT | Performed by: INTERNAL MEDICINE

## 2025-06-07 RX ORDER — DILTIAZEM HYDROCHLORIDE 30 MG/1
120 TABLET, FILM COATED ORAL 2 TIMES DAILY
Status: DISCONTINUED | OUTPATIENT
Start: 2025-06-07 | End: 2025-06-10

## 2025-06-07 RX ORDER — METOPROLOL SUCCINATE 50 MG/1
100 TABLET, EXTENDED RELEASE ORAL
Status: DISCONTINUED | OUTPATIENT
Start: 2025-06-07 | End: 2025-06-10

## 2025-06-07 RX ORDER — FUROSEMIDE 10 MG/ML
40 INJECTION INTRAMUSCULAR; INTRAVENOUS
Status: COMPLETED | OUTPATIENT
Start: 2025-06-07 | End: 2025-06-07

## 2025-06-07 NOTE — PLAN OF CARE
Assumed care around 1930  A&O x 4  Ra  Afib on tele  Pt denies pain   Purewick + brief in place  Cardiac diet  Piv l forearm cdi sl  Slp eval pending  Scheduled xarelto   Safety precautions in place, bed in lowest position, call light within reach, updated on poc, all needs met at this time.    Problem: CARDIOVASCULAR - ADULT  Goal: Maintains optimal cardiac output and hemodynamic stability  Description: INTERVENTIONS:  - Monitor vital signs, rhythm, and trends  - Monitor for bleeding, hypotension and signs of decreased cardiac output  - Evaluate effectiveness of vasoactive medications to optimize hemodynamic stability  - Monitor arterial and/or venous puncture sites for bleeding and/or hematoma  - Assess quality of pulses, skin color and temperature  - Assess for signs of decreased coronary artery perfusion - ex. Angina  - Evaluate fluid balance, assess for edema, trend weights  Outcome: Progressing  Goal: Absence of cardiac arrhythmias or at baseline  Description: INTERVENTIONS:  - Continuous cardiac monitoring, monitor vital signs, obtain 12 lead EKG if indicated  - Evaluate effectiveness of antiarrhythmic and heart rate control medications as ordered  - Initiate emergency measures for life threatening arrhythmias  - Monitor electrolytes and administer replacement therapy as ordered  Outcome: Progressing     Problem: METABOLIC/FLUID AND ELECTROLYTES - ADULT  Goal: Electrolytes maintained within normal limits  Description: INTERVENTIONS:  - Monitor labs and rhythm and assess patient for signs and symptoms of electrolyte imbalances  - Administer electrolyte replacement as ordered  - Monitor response to electrolyte replacements, including rhythm and repeat lab results as appropriate  - Fluid restriction as ordered  - Instruct patient on fluid and nutrition restrictions as appropriate  Outcome: Progressing  Goal: Hemodynamic stability and optimal renal function maintained  Description: INTERVENTIONS:  - Monitor labs  and assess for signs and symptoms of volume excess or deficit  - Monitor intake, output and patient weight  - Monitor urine specific gravity, serum osmolarity and serum sodium as indicated or ordered  - Monitor response to interventions for patient's volume status, including labs, urine output, blood pressure (other measures as available)  - Encourage oral intake as appropriate  - Instruct patient on fluid and nutrition restrictions as appropriate  Outcome: Progressing

## 2025-06-07 NOTE — PROGRESS NOTES
[facility] Hospitalist Progress Note     PCP: Roby Llamas MD    Chief Complaint: follow-up   Follow up for: The encounter diagnosis was Acute congestive heart failure, unspecified heart failure type (HCC).    Overnight/Interim Events:      SUBJECTIVE:  +JON but better, dec LE swelling. Taking PO ok.     OBJECTIVE:  Temp:  [97.7 °F (36.5 °C)-98.2 °F (36.8 °C)] 98.1 °F (36.7 °C)  Pulse:  [] 78  Resp:  [18-28] 25  BP: (102-153)/(40-97) 102/73  SpO2:  [90 %-97 %] 97 %    Intake/Output:    Intake/Output Summary (Last 24 hours) at 6/7/2025 1622  Last data filed at 6/7/2025 1606  Gross per 24 hour   Intake --   Output 2500 ml   Net -2500 ml       Last 3 Weights   06/05/25 0131 229 lb 0.9 oz (103.9 kg)   01/26/23 1523 227 lb 11.8 oz (103.3 kg)   01/26/23 1039 225 lb (102.1 kg)   04/04/22 1021 220 lb (99.8 kg)       Exam    General: Alert, no distress, appears stated age.     Head:  Normocephalic, without obvious abnormality, atraumatic.   Eyes:  Sclera anicteric, EOMs intact.    Nose: Nares normal,  Mucosa normal    Throat: Lips normal   Neck: Supple, symmetrical, trachea midline   Lungs:   Clear to auscultation bilaterally. Normal effort   Chest wall:  No tenderness or deformity   Heart:  irregular rate and rhythm, S1, S2 normal, no murmur, rub or gallop appreciated   Abdomen:   Soft, NT/ND, Bowel sounds normal. No masses,  No organomegaly.    Extremities: Extremities normal, atraumatic, no cyanosis. 1+ LE edema, improved.   Skin: Skin color, texture, turgor normal. No rashes or lesions.    Neurologic: Moving all extremities spontaneously, no focal deficit appreciated      Data Review:       Labs:     Recent Labs   Lab 06/04/25  1359 06/05/25  0734 06/06/25  0657   WBC 12.9* 9.7 10.5   HGB 12.6 11.3* 11.6*   MCV 77.0* 76.8* 77.6*   .0 264.0 267.0       Recent Labs   Lab 06/04/25  1359 06/05/25  0734 06/06/25  0657 06/07/25  0555    145 144 142   K 4.1 3.3* 3.6 4.2    108 107 106   CO2 26.0 28.0  28.0 29.0   BUN 19 19 18 18   CREATSERUM 1.26* 1.09* 1.02 1.04*   CA 8.9 8.2* 8.5* 8.8   MG  --  1.7 1.8  --    * 93 104* 94       Recent Labs   Lab 06/04/25  1359   ALT 31   AST 36*   ALB 4.2       No results for input(s): \"PGLU\" in the last 168 hours.    No results for input(s): \"TROP\" in the last 168 hours.      Meds:     Scheduled Medications[1]  Medication Infusions[2]  PRN Medications[3]       Assessment/Plan:     79 year old female with PMH including but not limited to thyroid ca, HT, HTN, afib, HFpEF who p/t [facility] ED c SOB x couple months.       Acute HFpEF   - tele  - cards following, apprec  - IV lasix 40mg BID   - echo EF 40%, mild-mod diffuse hypokineses   - cont home meds, added arb, increased BB, dilt 120mg BID  - check dig level   - daily I/Os, wts  - consider ischemic w/u as appropriate per cards      JON  - 2/2 above, on RA  - supp o2 as needed, walking o2 assessment p/t dc       A. fib with RVR   -Cardiology recommendations; IV cardizem  -xarelto   -[id number] =3     Hypothyroidism  -Resume home Synthroid   -free T4 high and TSH slightly low, possibly contributing to above   - decreased to 125mcg and re-check studies in 6 weeks     Hypertension  -Resume home antihypertensives as HR allows      Hypocalcemia  - follow     #hypomagnesium  -replete per protocol      Morbid obesity BMI >40  -Outpatient follow-up on resolution of above issues     # hypokalemia  -replete per protocol      SLP     Dispo: CTU    Questions/concerns were discussed with patient and  by bedside.     Jim Campos MD  [facility]  [phone]  6/7/2025  5:10 PM    Supplementary Documentation:   DVT Mechanical Prophylaxis:   SCDs,    DVT Pharmacologic Prophylaxis   Medication    rivaroxaban (Xarelto) tab 20 mg                Code Status: Prior  Buchanan: No urinary catheter in place  Buchanan Duration (in days):   Central line:    MENDY:         **Certification      PHYSICIAN Certification of Need  for Inpatient Hospitalization - Initial Certification    Patient will require inpatient services that will reasonably be expected to span two midnight's based on the clinical documentation in H+P.   Based on patients current state of illness, I anticipate that, after discharge, patient will require TBD.                  [1]    furosemide  40 mg Intravenous BID (Diuretic)    metoprolol succinate  100 mg Oral 2x Daily(Beta Blocker)    dilTIAZem  120 mg Oral BID    losartan  25 mg Oral Daily    digoxin  125 mcg Oral Daily    potassium chloride  20 mEq Oral Daily    levothyroxine  125 mcg Oral Before breakfast    spironolactone  12.5 mg Oral Daily    rivaroxaban  20 mg Oral Daily with food   [2] [3]   pantoprazole

## 2025-06-07 NOTE — PLAN OF CARE
Assumed patient care at 0730. A/Ox4. Room air. Afib on tele. HR controlled. Electrolyte protocol (cardiac). Cardiac diet. Speech evaluated. Ambulates Stand by. Left forearm SL. IV lasix per MAR. All safety precautions are in place and will continue with plan of care.    Problem: METABOLIC/FLUID AND ELECTROLYTES - ADULT  Goal: Electrolytes maintained within normal limits  Description: INTERVENTIONS:  - Monitor labs and rhythm and assess patient for signs and symptoms of electrolyte imbalances  - Administer electrolyte replacement as ordered  - Monitor response to electrolyte replacements, including rhythm and repeat lab results as appropriate  - Fluid restriction as ordered  - Instruct patient on fluid and nutrition restrictions as appropriate  Outcome: Progressing  Goal: Hemodynamic stability and optimal renal function maintained  Description: INTERVENTIONS:  - Monitor labs and assess for signs and symptoms of volume excess or deficit  - Monitor intake, output and patient weight  - Monitor urine specific gravity, serum osmolarity and serum sodium as indicated or ordered  - Monitor response to interventions for patient's volume status, including labs, urine output, blood pressure (other measures as available)  - Encourage oral intake as appropriate  - Instruct patient on fluid and nutrition restrictions as appropriate  Outcome: Progressing     Problem: CARDIOVASCULAR - ADULT  Goal: Maintains optimal cardiac output and hemodynamic stability  Description: INTERVENTIONS:  - Monitor vital signs, rhythm, and trends  - Monitor for bleeding, hypotension and signs of decreased cardiac output  - Evaluate effectiveness of vasoactive medications to optimize hemodynamic stability  - Monitor arterial and/or venous puncture sites for bleeding and/or hematoma  - Assess quality of pulses, skin color and temperature  - Assess for signs of decreased coronary artery perfusion - ex. Angina  - Evaluate fluid balance, assess for edema,  trend weights  Outcome: Progressing  Goal: Absence of cardiac arrhythmias or at baseline  Description: INTERVENTIONS:  - Continuous cardiac monitoring, monitor vital signs, obtain 12 lead EKG if indicated  - Evaluate effectiveness of antiarrhythmic and heart rate control medications as ordered  - Initiate emergency measures for life threatening arrhythmias  - Monitor electrolytes and administer replacement therapy as ordered  Outcome: Progressing

## 2025-06-07 NOTE — PROGRESS NOTES
[facility] Cardiology Progress Note        Sariah Montague Patient Status:  Inpatient    10/18/1945 MRN [id number]   Location [facility] 3NE-A Attending Jim Campos MD   Hosp Day # 3 PCP Roby Llamas MD     Subjective:  Patient seen and examined this morning.  Breathing overall improving.    Medications:  Scheduled Medications[1]    Continuous Infusions:  Medication Infusions[2]      Allergies:  Allergies[3]      Objective:        Intake/Output:      Intake/Output Summary (Last 24 hours) at 2025 0805  Last data filed at 2025 0550  Gross per 24 hour   Intake --   Output 900 ml   Net -900 ml     Wt Readings from Last 3 Encounters:   25 229 lb 0.9 oz (103.9 kg)   23 227 lb 11.8 oz (103.3 kg)   22 220 lb (99.8 kg)       Physical Exam:        Vitals:    25 2043 25 0004 25 0308 25 0428   BP: 129/40 106/60  153/81   BP Location: Right arm Right arm  Left arm   Pulse: 72 72 84 62   Resp: 24 18  18   Temp: 98.1 °F (36.7 °C) 98.1 °F (36.7 °C)  98.1 °F (36.7 °C)   TempSrc: Oral Oral  Oral   SpO2: 91% 95%  95%   Weight:           Temp:  [98.1 °F (36.7 °C)-98.2 °F (36.8 °C)] 98.1 °F (36.7 °C)  Pulse:  [] 62  Resp:  [18-24] 18  BP: (105-153)/(40-81) 153/81  SpO2:  [90 %-95 %] 95 %      Temp: 98.1 °F (36.7 °C)  Pulse: 62  Resp: 18  BP: 153/81  General:  Appears comfortable  HEENT: No focal deficits.  Neck: No JVD, carotids 2+ no bruits.  Cardiac: Irregular S1S2.  No S3, S4, rub, click.  No murmur.  Lungs: Clear to auscultation and percussion.  Abdomen: Soft, non-tender.   Extremities: 1 + bilateral LE edema.  No clubbing or cyanosis.    Neurologic: Alert and oriented, normal affect.  Skin: Warm and dry.           LABS:      HEM:  Recent Labs   Lab 25  1359 25  0734 25  0657   WBC 12.9* 9.7 10.5   HGB 12.6 11.3* 11.6*   HCT 41.6 36.8 38.2   .0 264.0 267.0       Chem:  Recent Labs   Lab 25  9535  06/05/25  0734 06/06/25  0657 06/07/25  0555    145 144 142   K 4.1 3.3* 3.6 4.2    108 107 106   CO2 26.0 28.0 28.0 29.0   BUN 19 19 18 18   CREATSERUM 1.26* 1.09* 1.02 1.04*   CA 8.9 8.2* 8.5* 8.8   MG  --  1.7 1.8  --    * 93 104* 94       Recent Labs   Lab 06/04/25  1359   ALT 31   AST 36*   ALB 4.2       No results for input(s): \"PT\", \"PTT\", \"INR\" in the last 168 hours.        Lab Results   Component Value Date    TROP 0.147 () 01/11/2017    TROP 0.101 () 11/04/2016    TROP 0.184 () 02/05/2016         Invalid input(s): \"PBNPML\"                       Diagnostics:   Telemetry: Atrial Fibrillation /flutter, 100    EKG, 6/5/2025,     Echo, 6/5/25:    1. Left ventricle: The cavity size was normal. Wall thickness was normal.      Systolic function was mildly to moderately reduced. The estimated      ejection fraction was 40%, by visual assessment. There was mild -      moderate diffuse hypokinesis with no identifiable regional variations.      Unable to assess LV diastolic function due to heart rhythm.   2. Right ventricle: The cavity size was moderately increased. Systolic      function was normal.   3. Left atrium: The left atrial volume was moderately increased.   4. Right atrium: The atrium was moderately dilated.   5. Mitral valve: The annulus was mildly to moderately calcified. Systolic      bowing without prolapse, involving the anterior leaflet and the posterior      leaflet. There was mild to moderate regurgitation.   6. Tricuspid valve: There was severe regurgitation.   7. Pulmonary arteries: Systolic pressure was mildly to moderately increased,      in the range of 45mm Hg to 50mm Hg.     *          Laboratories and Data:  Diagnostics:     EKG, 6/4/2025:  atrial flutter, 137/min, NSTW changes     CXR, 6/4/2025:       CONCLUSION:  Cardiomegaly.  There is pulmonary venous congestion.      There is bibasilar airspace disease with bilateral pleural effusions right greater than left.       Findings consistent with CHF.  Superimposed bibasilar pneumonia not to be excluded.             Impression:     1.  Acute HFrEF   -LVEF reduced at 40% (6/25 -previously preserved 1/23   -moderate pulm Hypertension     - 153/81 with sats stable on room air   - GDMT as tolerated   - Continue Lasix 40 IV twice daily   - Potassium 4.2 and creatinine 1.04 (6/7/25)     Hypertension  -153/81  -Toprol 75 twice daily, losartan 25 daily, and diltiazem 90 tid  - On Lasix and Aldactone 12.5    2.  Atrial arrhythmia  -[id number] of at least 4 -Xarelto 20  -Currently on digoxin 0.125 daily  -Toprol 75 twice daily and diltiazem 90  tid    RVR  (which may have triggered #1). HR's somewhat improved  3. Hypertension   4. obesity        Recommend:     1.  Telemetry  2.  Continue IV lasix 40 mg bid with daily BMP  3.  Check digoxin level tomorrow  4.  Increase Toprol to 100 twice daily   4.  Transition diltiazem to 120 twice daily       Thank you for allowing me to participate in the care of your patient.    MD Linda Valerio Cardiac Electrophysiology    -----------------------------------------------------------------------------             [1]    metoprolol succinate  75 mg Oral 2x Daily(Beta Blocker)    losartan  25 mg Oral Daily    digoxin  125 mcg Oral Daily    dilTIAZem  90 mg Oral Q8H ZOË    potassium chloride  20 mEq Oral Daily    levothyroxine  125 mcg Oral Before breakfast    spironolactone  12.5 mg Oral Daily    rivaroxaban  20 mg Oral Daily with food   [2] [3]   Allergies  Allergen Reactions    Adhesive Tape RASH

## 2025-06-07 NOTE — PHYSICAL THERAPY NOTE
PHYSICAL THERAPY TREATMENT NOTE - INPATIENT    Room Number: 3614/3614-A     Session: 1     Number of Visits to Meet Established Goals: 2    Presenting Problem: worsening dyspnea  Co-Morbidities : A fib, Heart failure w/ reduced EF, HTN    PHYSICAL THERAPY ASSESSMENT   Patient demonstrates good  progress this session, goals  remain in progress.      Patient is requiring contact guard assist as a result of the following impairments: decreased endurance/aerobic capacity.     Patient continues to function near baseline with transfers and gait.  Next session anticipate patient to progress gait and stair negotiation.  Physical Therapy will continue to follow patient for duration of hospitalization.    Patient continues to benefit from continued skilled PT services: with no additional skilled services but increased support at home.    PLAN DURING HOSPITALIZATION  Nursing Mobility Recommendation : 1 Assist  PT Device Recommendation: Rolling walker  PT Treatment Plan: Stair training, Gait training, Energy conservation, Transfer training  Frequency (Obs):  (2-3x / wk)     CURRENT GOALS     Goal #1 Patient is able to demonstrate transfers Sit to/from Stand at assistance level: supervision   GOAL MET   Goal #2 Patient is able to ambulate 150 feet with assist device: LRAD at assistance level: supervision   PROGRESSING   Goal #3 Patient is able to navigate 1 flight of stairs w/ assist device: LRAD at assistance level: supervision  PROGRESSING   Goal #4     Goal #5     Goal Comments: Goals established on 6/5/2025, progressing 6/7/25.        PHYSICAL THERAPY MEDICAL/SOCIAL HISTORY  History related to current admission: Patient is a 79 year old female admitted on 6/4/2025 for worsening dyspnea.      HOME SITUATION  Type of Home: House  Home Layout: Two level  Stairs to Enter : 2 (2 JIMI from front door, 3 JIMI from garage)        Stairs to Bedroom: 12    Railing: Yes    Lives With: Spouse                Prior Level of Summit:  IND w/ functional mobility and ADLs.    SUBJECTIVE  \"I feel pretty good.\"      OBJECTIVE  Precautions: Bed/chair alarm    WEIGHT BEARING RESTRICTION     PAIN ASSESSMENT   Ratin          BALANCE                                                                                                                       Static Sitting: Good  Dynamic Sitting: Good           Static Standing: Fair +  Dynamic Standing: Fair +    ACTIVITY TOLERANCE                         O2 WALK       AM-PAC '6-Clicks' INPATIENT SHORT FORM - BASIC MOBILITY  How much difficulty does the patient currently have...  Patient Difficulty: Turning over in bed (including adjusting bedclothes, sheets and blankets)?: None   Patient Difficulty: Sitting down on and standing up from a chair with arms (e.g., wheelchair, bedside commode, etc.): A Little   Patient Difficulty: Moving from lying on back to sitting on the side of the bed?: A Little   How much help from another person does the patient currently need...   Help from Another: Moving to and from a bed to a chair (including a wheelchair)?: A Little   Help from Another: Need to walk in hospital room?: A Little   Help from Another: Climbing 3-5 steps with a railing?: A Little     AM-PAC Score:  Raw Score: 19   Approx Degree of Impairment: 41.77%   Standardized Score (AM-PAC Scale): 45.44   CMS Modifier (G-Code): CK    FUNCTIONAL ABILITY STATUS  Gait Assessment   Functional Mobility/Gait Assessment  Gait Assistance: Contact guard assist  Distance (ft): 150  Assistive Device: None  Pattern: Within Functional Limits    Skilled Therapy Provided      Transfer Mobility:  Sit<>Stand: CGA   Stand<>Sit: CGA   Gait: Ambulation completed as above.    Therapist's Comments: Pt encouraged to be OOB to chair, to ambulate 3x/day in hallway.  Written HEP given for LE exercises and respiratory exercises.  D/w pt re: completing throughout the day, able to demo few reps.        Patient End of Session: Up in chair, Needs  met, Call light within reach, RN aware of session/findings, All patient questions and concerns addressed, Alarm set, Discussed recommendations with /    PT Session Time: 23 minutes  Gait Trainin minutes  Therapeutic Activity: 15 minutes

## 2025-06-07 NOTE — SLP NOTE
ADULT BEDSIDE SWALLOWING EVALUATION    ASSESSMENT    ASSESSMENT/OVERALL IMPRESSION:  Order received for BSE due to cough with PO intake; chart reviewed.  Patient is 79 y.o here with worsening dyspnea and diagnosed with acute CHF. Patient with no previous dysphagia or SLP consult.  Medical history includes afib, heart failure with reduced EF, HTN.  Patient on regular texture diet with thin liquids.     Patient received awake, alert, and pleasant. Spouse present. Patient on room air. Patient reported occasional cough and choking when attempted to drink water to get pills down. Patient endorsed SOB with eating. Oral motor exam WFL.  Vocal quality clear. Speech intelligibility 100%.  Patient sat at EOB and able to self administer PO trials.     Patient exhibited intact oropharyngeal swallow function.  Educated/trained patient on swallow strategies for energy conservation and to facilitate swallow/respiratory coordination given underlying cardiopulmonary issues.  Patient able to carryover and demonstrate adequate SPO2 and respirations non-labored within controlled conditions of this evaluation. Patient/spouse asked pertinent questions and all were answered to their understanding.  Recommendations as below.          RECOMMENDATIONS   Diet Recommendations - Solids: Regular  Diet Recommendations - Liquids: Thin Liquids, no straws    Patient to sit at EOB and fully upright     Slow rate    Small bite or sip at a time     Aspiration Precautions: Upright position, Slow rate, Small bites, Small sips, No straw    Medication Administration Recommendations: One pill at a time, Whole in puree versus crushed with larger pills    Treatment Plan/Recommendations: Aspiration precautions    HISTORY   MEDICAL HISTORY  Reason for Referral: R/O aspiration    Problem List  Principal Problem:    Acute congestive heart failure, unspecified heart failure type (HCC)      Past Medical History  Past Medical History[1]    Prior Living Situation:  Home with spouse  Diet Prior to Admission: Regular, Thin liquids       Patient/Family Goals: \"this has been helpful\"    SWALLOWING HISTORY  Current Diet Consistency: Regular, Thin liquids    Dysphagia History: None    Imaging Results: Cardiomegaly.  There is pulmonary venous congestion.      There is bibasilar airspace disease with bilateral pleural effusions right greater than left.      Findings consistent with CHF.  Superimposed bibasilar pneumonia not to be excluded.     OBJECTIVE   ORAL MOTOR EXAMINATION  Dentition: Natural, Functional  Symmetry: Within Functional Limits  Strength: Within Functional Limits  Tone: Within Functional Limits  Range of Motion: Within Functional Limits       Voice Quality: Clear  Respiratory Status: Unlabored  Consistencies Trialed: Thin liquids, Puree, Hard solid  Method of Presentation: Self presentation  Patient Positioned: Upright, Midline    Oral Phase of Swallow: Within Functional Limits     Pharyngeal Phase of Swallow: Within Functional Limits           (Please note: Silent aspiration cannot be evaluated clinically. Videofluoroscopic Swallow Study is required to rule-out silent aspiration.)    Esophageal Phase of Swallow: No complaints consistent with possible esophageal involvement                GOALS  Goal #1 The patient will tolerate regular consistency and thin liquids without overt signs or symptoms of aspiration with 90 % accuracy over 1-2 session(s).  In Progress   Goal #2 The patient/family/caregiver will demonstrate understanding and implementation of aspiration precautions and swallow strategies independently over 1-2 session(s).    In Progress   Goal #3 The patient will utilize compensatory strategies as outlined by  BSSE (clinical evaluation) including Slow rate, Small bites, Small sips, No straws, Upright 90 degrees with no assistance 90 % of the time across 1-2 sessions.  In Progress     FOLLOW UP  Treatment Plan/Recommendations: Aspiration precautions      Follow Up Needed (Documentation Required): Yes  SLP Follow-up Date: 06/09/25    Thank you for your referral.   If you have any questions, please contact DENIA Mariee MS CCC-SLP/L  Speech-Language Pathologist  Spectra-Link [phone]         [1]   Past Medical History:   CANCER    Thyroid CA    Cancer (HCC)    tyroid    Essential hypertension    HYPOTHYROIDISM    After surgery    Thyroid disease

## 2025-06-08 LAB — DIGOXIN SERPL-MCNC: 0.73 NG/ML (ref 0.8–2)

## 2025-06-08 PROCEDURE — 80162 ASSAY OF DIGOXIN TOTAL: CPT | Performed by: INTERNAL MEDICINE

## 2025-06-08 RX ORDER — FUROSEMIDE 10 MG/ML
40 INJECTION INTRAMUSCULAR; INTRAVENOUS
Status: DISCONTINUED | OUTPATIENT
Start: 2025-06-08 | End: 2025-06-09

## 2025-06-08 NOTE — PROGRESS NOTES
[facility] Hospitalist Progress Note     PCP: Roby Llamas MD    Chief Complaint: follow-up   Follow up for: The encounter diagnosis was Acute congestive heart failure, unspecified heart failure type (HCC).    Overnight/Interim Events:      SUBJECTIVE:  MORE Labored breathing. Hasn't walked today, RN will try to walk. Pt feels more swelling in legs. Taking PO    OBJECTIVE:  Temp:  [98.1 °F (36.7 °C)-98.7 °F (37.1 °C)] 98.2 °F (36.8 °C)  Pulse:  [] 79  Resp:  [14-25] 17  BP: (102-141)/(65-73) 141/72  SpO2:  [89 %-97 %] 92 %    Intake/Output:    Intake/Output Summary (Last 24 hours) at 6/8/2025 1143  Last data filed at 6/8/2025 0415  Gross per 24 hour   Intake 720 ml   Output 2400 ml   Net -1680 ml       Last 3 Weights   06/08/25 0804 220 lb 3.2 oz (99.9 kg)   06/05/25 0131 229 lb 0.9 oz (103.9 kg)   01/26/23 1523 227 lb 11.8 oz (103.3 kg)   01/26/23 1039 225 lb (102.1 kg)   04/04/22 1021 220 lb (99.8 kg)       Exam    General: Alert, no distress, appears stated age.     Head:  Normocephalic, without obvious abnormality, atraumatic.   Eyes:  Sclera anicteric, EOMs intact.    Nose: Nares normal,  Mucosa normal    Throat: Lips normal   Neck: Supple, symmetrical, trachea midline   Lungs:   Clear to auscultation bilaterally. Normal effort   Chest wall:  No tenderness or deformity   Heart:  irregular rate and rhythm, S1, S2 normal, no murmur, rub or gallop appreciated   Abdomen:   Soft, NT/ND, Bowel sounds normal. No masses,  No organomegaly.    Extremities: Extremities normal, atraumatic, no cyanosis. 1+ LE edema, improved.   Skin: Skin color, texture, turgor normal. No rashes or lesions.    Neurologic: Moving all extremities spontaneously, no focal deficit appreciated      Data Review:       Labs:     Recent Labs   Lab 06/04/25  1359 06/05/25  0734 06/06/25  0657   WBC 12.9* 9.7 10.5   HGB 12.6 11.3* 11.6*   MCV 77.0* 76.8* 77.6*   .0 264.0 267.0       Recent Labs   Lab 06/04/25  1359 06/05/25  0727  06/06/25  0657 06/07/25  0555    145 144 142   K 4.1 3.3* 3.6 4.2    108 107 106   CO2 26.0 28.0 28.0 29.0   BUN 19 19 18 18   CREATSERUM 1.26* 1.09* 1.02 1.04*   CA 8.9 8.2* 8.5* 8.8   MG  --  1.7 1.8  --    * 93 104* 94       Recent Labs   Lab 06/04/25  1359   ALT 31   AST 36*   ALB 4.2       No results for input(s): \"PGLU\" in the last 168 hours.    No results for input(s): \"TROP\" in the last 168 hours.      Meds:     Scheduled Medications[1]  Medication Infusions[2]  PRN Medications[3]       Assessment/Plan:     79 year old female with PMH including but not limited to thyroid ca, HT, HTN, afib, HFpEF who p/t [facility] ED c SOB x couple months.       Acute HFpEF   - tele  - cards following, apprec  - IV lasix 40mg BID   - echo EF 40%, mild-mod diffuse hypokineses   - cont home meds, added arb, increased BB, dilt 120mg BID (was 180mg on prior paperwork from cards office)  - check dig level   - daily I/Os, wts  - consider ischemic w/u as appropriate per cards      JON  - 2/2 above, on RA  - supp o2 as needed, walking o2 assessment p/t dc       A. fib with RVR   -Cardiology recommendations; IV cardizem  -xarelto   -[id number] =3     Hypothyroidism  -Resume home Synthroid   -free T4 high and TSH slightly low, possibly contributing to above   - decreased to 125mcg and re-check studies in 6 weeks     Hypertension  -Resume home antihypertensives as HR allows      Hypocalcemia  - follow     #hypomagnesium  -replete per protocol      Morbid obesity BMI >40  -Outpatient follow-up on resolution of above issues     # hypokalemia  -replete per protocol      SLP     Dispo: CTU    Questions/concerns were discussed with patient and  by bedside.     Supplementary Documentation:   DVT Mechanical Prophylaxis:   SCDs,    DVT Pharmacologic Prophylaxis   Medication    rivaroxaban (Xarelto) tab 20 mg                Code Status: Prior  Buchanan: No urinary catheter in place  Buchanan Duration (in days):   Central line:     MENDY:         **Certification      PHYSICIAN Certification of Need for Inpatient Hospitalization - Initial Certification    Patient will require inpatient services that will reasonably be expected to span two midnight's based on the clinical documentation in H+P.   Based on patients current state of illness, I anticipate that, after discharge, patient will require TBD.           Jim Campos MD  [facility] Hospitalist  [phone]  6/8/2025  12:44 PM         [1]    metoprolol succinate  100 mg Oral 2x Daily(Beta Blocker)    dilTIAZem  120 mg Oral BID    losartan  25 mg Oral Daily    digoxin  125 mcg Oral Daily    potassium chloride  20 mEq Oral Daily    levothyroxine  125 mcg Oral Before breakfast    spironolactone  12.5 mg Oral Daily    rivaroxaban  20 mg Oral Daily with food   [2] [3]   pantoprazole

## 2025-06-08 NOTE — PLAN OF CARE
Assumed care for this patient at 1930, pt is alert and oriented x 4, family present during rounding,  denied any pain, RA, Afib on tele, cardiac replacement electrolyte protocol, cardiac diet, purewick and brief in place, Mobility: SBA.  Safety protocol is in place: call light within the patient's reach, bed in lower position, all need are met at this time. Plan of care is ongoing.    Problem: CARDIOVASCULAR - ADULT  Goal: Maintains optimal cardiac output and hemodynamic stability  Description: INTERVENTIONS:  - Monitor vital signs, rhythm, and trends  - Monitor for bleeding, hypotension and signs of decreased cardiac output  - Evaluate effectiveness of vasoactive medications to optimize hemodynamic stability  - Monitor arterial and/or venous puncture sites for bleeding and/or hematoma  - Assess quality of pulses, skin color and temperature  - Assess for signs of decreased coronary artery perfusion - ex. Angina  - Evaluate fluid balance, assess for edema, trend weights  Outcome: Progressing  Goal: Absence of cardiac arrhythmias or at baseline  Description: INTERVENTIONS:  - Continuous cardiac monitoring, monitor vital signs, obtain 12 lead EKG if indicated  - Evaluate effectiveness of antiarrhythmic and heart rate control medications as ordered  - Initiate emergency measures for life threatening arrhythmias  - Monitor electrolytes and administer replacement therapy as ordered  Outcome: Progressing

## 2025-06-08 NOTE — PROGRESS NOTES
[facility] Cardiology Progress Note        Sariah Montague Patient Status:  Inpatient    10/18/1945 MRN [id number]   Location [facility] 3NE-A Attending Jim Campos MD   Hosp Day # 4 PCP Roby Llamas MD     Subjective:  Patient seen and examined this morning.  Breathing overall improving.    Medications:  Scheduled Medications[1]    Continuous Infusions:  Medication Infusions[2]      Allergies:  Allergies[3]      Objective:        Intake/Output:      Intake/Output Summary (Last 24 hours) at 2025 0719  Last data filed at 2025 0415  Gross per 24 hour   Intake 720 ml   Output 2400 ml   Net -1680 ml     Wt Readings from Last 3 Encounters:   25 229 lb 0.9 oz (103.9 kg)   23 227 lb 11.8 oz (103.3 kg)   22 220 lb (99.8 kg)       Physical Exam:        Vitals:    25 2104 25 2325 25 0415   BP: 108/65  115/67 127/67   BP Location: Right arm  Left arm Left arm   Pulse: 65 (!) 165 61 60   Resp: 24 14 19 16   Temp: 98.2 °F (36.8 °C)  98.7 °F (37.1 °C) 98.4 °F (36.9 °C)   TempSrc: Oral  Oral Oral   SpO2: (!) 89% 91% 90% (!) 89%   Weight:       Height:           Temp:  [97.7 °F (36.5 °C)-98.7 °F (37.1 °C)] 98.4 °F (36.9 °C)  Pulse:  [] 60  Resp:  [14-28] 16  BP: (102-150)/(58-97) 127/67  SpO2:  [89 %-97 %] 89 %      Temp: 98.4 °F (36.9 °C)  Pulse: 60  Resp: 16  BP: 127/67  General:  Appears comfortable  HEENT: No focal deficits.  Neck: No JVD, carotids 2+ no bruits.  Cardiac: Irregular S1S2.  No S3, S4, rub, click.  No murmur.  Lungs: Clear to auscultation and percussion.  Abdomen: Soft, non-tender.   Extremities: 1 + bilateral LE edema.  No clubbing or cyanosis.    Neurologic: Alert and oriented, normal affect.  Skin: Warm and dry.           LABS:      HEM:  Recent Labs   Lab 25  1359 25  0734 25  0657   WBC 12.9* 9.7 10.5   HGB 12.6 11.3* 11.6*   HCT 41.6 36.8 38.2   .0 264.0 267.0        Chem:  Recent Labs   Lab 06/04/25  1359 06/05/25  0734 06/06/25  0657 06/07/25  0555    145 144 142   K 4.1 3.3* 3.6 4.2    108 107 106   CO2 26.0 28.0 28.0 29.0   BUN 19 19 18 18   CREATSERUM 1.26* 1.09* 1.02 1.04*   CA 8.9 8.2* 8.5* 8.8   MG  --  1.7 1.8  --    * 93 104* 94       Recent Labs   Lab 06/04/25  1359   ALT 31   AST 36*   ALB 4.2       No results for input(s): \"PT\", \"PTT\", \"INR\" in the last 168 hours.        Lab Results   Component Value Date    TROP 0.147 () 01/11/2017    TROP 0.101 () 11/04/2016    TROP 0.184 () 02/05/2016         Invalid input(s): \"PBNPML\"                       Diagnostics:   Telemetry: Atrial Fibrillation /flutter, 100    EKG, 6/5/2025,     Echo, 6/5/25:    1. Left ventricle: The cavity size was normal. Wall thickness was normal.      Systolic function was mildly to moderately reduced. The estimated      ejection fraction was 40%, by visual assessment. There was mild -      moderate diffuse hypokinesis with no identifiable regional variations.      Unable to assess LV diastolic function due to heart rhythm.   2. Right ventricle: The cavity size was moderately increased. Systolic      function was normal.   3. Left atrium: The left atrial volume was moderately increased.   4. Right atrium: The atrium was moderately dilated.   5. Mitral valve: The annulus was mildly to moderately calcified. Systolic      bowing without prolapse, involving the anterior leaflet and the posterior      leaflet. There was mild to moderate regurgitation.   6. Tricuspid valve: There was severe regurgitation.   7. Pulmonary arteries: Systolic pressure was mildly to moderately increased,      in the range of 45mm Hg to 50mm Hg.     *          Laboratories and Data:  Diagnostics:     EKG, 6/4/2025:  atrial flutter, 137/min, NSTW changes     CXR, 6/4/2025:       CONCLUSION:  Cardiomegaly.  There is pulmonary venous congestion.      There is bibasilar airspace disease with bilateral  pleural effusions right greater than left.      Findings consistent with CHF.  Superimposed bibasilar pneumonia not to be excluded.             Impression:     1.  Acute HFrEF   -LVEF reduced at 40% (6/25 -previously preserved 1/23   -moderate pulm Hypertension     - 127/67 with sats 89% on room air    - GDMT as tolerated   - Continue Lasix 40 IV twice daily   - Potassium 4.2 and creatinine 1.04 (6/7/25)     Hypertension  - 127/67  -Toprol 75 twice daily, losartan 25 daily, and diltiazem 90 tid  - On Lasix and Aldactone 12.5    2.  Atrial arrhythmia  -[id number] of at least 4 -Xarelto 20  -Currently on digoxin 0.125 daily  -Toprol 75 twice daily and diltiazem 90  tid    RVR  (which may have triggered #1). HR's somewhat improved  3. Hypertension   4. obesity        Recommend:     1.  Telemetry  2.  Continue IV lasix 40 mg bid  3.  BMP/digoxin level pending  4.  Continue Toprol 100 twice daily along with diltiazem 120 twice daily    Thank you for allowing me to participate in the care of your patient.    MD Linda Vlaerio Cardiac Electrophysiology    -----------------------------------------------------------------------------             [1]    metoprolol succinate  100 mg Oral 2x Daily(Beta Blocker)    dilTIAZem  120 mg Oral BID    losartan  25 mg Oral Daily    digoxin  125 mcg Oral Daily    potassium chloride  20 mEq Oral Daily    levothyroxine  125 mcg Oral Before breakfast    spironolactone  12.5 mg Oral Daily    rivaroxaban  20 mg Oral Daily with food   [2] [3]   Allergies  Allergen Reactions    Adhesive Tape RASH

## 2025-06-08 NOTE — PLAN OF CARE
Assumed care at 0730  A/O x 4  RA  Afib on tele. Denies CP and SOB currently. Rates controlled.  VSS  Purewick for urgency. Continent x 2.  Up SBA with walker  Cardiac EP no needs  Takes meds whole with apple sauce  On xarelto  Daily weight and I/O  R AC PIV saline locked with good blood return  Cardiac diet  Denies pain  All needs met. Pt and pts  at bedside updated. Will continue with plan of care.    Problem: CARDIOVASCULAR - ADULT  Goal: Maintains optimal cardiac output and hemodynamic stability  Description: INTERVENTIONS:  - Monitor vital signs, rhythm, and trends  - Monitor for bleeding, hypotension and signs of decreased cardiac output  - Evaluate effectiveness of vasoactive medications to optimize hemodynamic stability  - Monitor arterial and/or venous puncture sites for bleeding and/or hematoma  - Assess quality of pulses, skin color and temperature  - Assess for signs of decreased coronary artery perfusion - ex. Angina  - Evaluate fluid balance, assess for edema, trend weights  Outcome: Progressing  Goal: Absence of cardiac arrhythmias or at baseline  Description: INTERVENTIONS:  - Continuous cardiac monitoring, monitor vital signs, obtain 12 lead EKG if indicated  - Evaluate effectiveness of antiarrhythmic and heart rate control medications as ordered  - Initiate emergency measures for life threatening arrhythmias  - Monitor electrolytes and administer replacement therapy as ordered  Outcome: Progressing     Problem: METABOLIC/FLUID AND ELECTROLYTES - ADULT  Goal: Electrolytes maintained within normal limits  Description: INTERVENTIONS:  - Monitor labs and rhythm and assess patient for signs and symptoms of electrolyte imbalances  - Administer electrolyte replacement as ordered  - Monitor response to electrolyte replacements, including rhythm and repeat lab results as appropriate  - Fluid restriction as ordered  - Instruct patient on fluid and nutrition restrictions as appropriate  Outcome:  Progressing  Goal: Hemodynamic stability and optimal renal function maintained  Description: INTERVENTIONS:  - Monitor labs and assess for signs and symptoms of volume excess or deficit  - Monitor intake, output and patient weight  - Monitor urine specific gravity, serum osmolarity and serum sodium as indicated or ordered  - Monitor response to interventions for patient's volume status, including labs, urine output, blood pressure (other measures as available)  - Encourage oral intake as appropriate  - Instruct patient on fluid and nutrition restrictions as appropriate  Outcome: Progressing

## 2025-06-09 PROCEDURE — 97535 SELF CARE MNGMENT TRAINING: CPT

## 2025-06-09 PROCEDURE — 92526 ORAL FUNCTION THERAPY: CPT

## 2025-06-09 PROCEDURE — 94760 N-INVAS EAR/PLS OXIMETRY 1: CPT

## 2025-06-09 RX ORDER — TORSEMIDE 20 MG/1
40 TABLET ORAL DAILY
Status: DISCONTINUED | OUTPATIENT
Start: 2025-06-09 | End: 2025-06-10

## 2025-06-09 NOTE — PROGRESS NOTES
[facility] Cardiology Progress Note        Sariah Montague Patient Status:  Inpatient    10/18/1945 MRN [id number]   Location [facility] 3NE-A Attending Jim Campos MD   Hosp Day # 5 PCP Roby Llamas MD     Subjective:  Patient denies chest pain and dyspnea.  Feels well  Able to walk without difficulty    Medications:  Scheduled Medications[1]    Continuous Infusions:  Medication Infusions[2]      Allergies:  Allergies[3]      Objective:        Intake/Output:      Intake/Output Summary (Last 24 hours) at 2025 0820  Last data filed at 2025 0057  Gross per 24 hour   Intake --   Output 975 ml   Net -975 ml     Wt Readings from Last 3 Encounters:   25 209 lb 1.6 oz (94.8 kg)   23 227 lb 11.8 oz (103.3 kg)   22 220 lb (99.8 kg)       Physical Exam:        Vitals:    25 0200 25 0300 25 0400 25 0404   BP:    141/87   BP Location:    Left arm   Pulse: 57 66 81 71   Resp: 14 15 17 16   Temp:    97.6 °F (36.4 °C)   TempSrc:    Oral   SpO2: 94% 95% 93% 94%   Weight:    209 lb 1.6 oz (94.8 kg)   Height:           Temp:  [97.6 °F (36.4 °C)-98.1 °F (36.7 °C)] 97.6 °F (36.4 °C)  Pulse:  [56-88] 71  Resp:  [14-23] 16  BP: (110-141)/(62-87) 141/87  SpO2:  [90 %-95 %] 94 %      Temp: 97.6 °F (36.4 °C)  Pulse: 71  Resp: 16  BP: 141/87  General:  Appears comfortable  HEENT: No focal deficits.  Neck: No JVD, carotids 2+ no bruits.  Cardiac: Irregular S1S2.  No S3, S4, rub, click.  No murmur.  Lungs: Clear to auscultation and percussion.  Abdomen: Soft, non-tender.   Extremities: 1 + bilateral LE edema.  No clubbing or cyanosis.    Neurologic: Alert and oriented, normal affect.  Skin: Warm and dry.           LABS:      HEM:  Recent Labs   Lab 25  1359 25  0734 25  0657   WBC 12.9* 9.7 10.5   HGB 12.6 11.3* 11.6*   HCT 41.6 36.8 38.2   .0 264.0 267.0       Chem:  Recent Labs   Lab 25  1359 25  0734  06/06/25  0657 06/07/25  0555    145 144 142   K 4.1 3.3* 3.6 4.2    108 107 106   CO2 26.0 28.0 28.0 29.0   BUN 19 19 18 18   CREATSERUM 1.26* 1.09* 1.02 1.04*   CA 8.9 8.2* 8.5* 8.8   MG  --  1.7 1.8  --    * 93 104* 94       Recent Labs   Lab 06/04/25  1359   ALT 31   AST 36*   ALB 4.2       No results for input(s): \"PT\", \"PTT\", \"INR\" in the last 168 hours.        Lab Results   Component Value Date    TROP 0.147 () 01/11/2017    TROP 0.101 () 11/04/2016    TROP 0.184 () 02/05/2016         Invalid input(s): \"PBNPML\"                       Diagnostics:   Telemetry: Atrial Fibrillation /flutter, 100    EKG, 6/5/2025,     Echo, 6/5/25:    1. Left ventricle: The cavity size was normal. Wall thickness was normal.      Systolic function was mildly to moderately reduced. The estimated      ejection fraction was 40%, by visual assessment. There was mild -      moderate diffuse hypokinesis with no identifiable regional variations.      Unable to assess LV diastolic function due to heart rhythm.   2. Right ventricle: The cavity size was moderately increased. Systolic      function was normal.   3. Left atrium: The left atrial volume was moderately increased.   4. Right atrium: The atrium was moderately dilated.   5. Mitral valve: The annulus was mildly to moderately calcified. Systolic      bowing without prolapse, involving the anterior leaflet and the posterior      leaflet. There was mild to moderate regurgitation.   6. Tricuspid valve: There was severe regurgitation.   7. Pulmonary arteries: Systolic pressure was mildly to moderately increased,      in the range of 45mm Hg to 50mm Hg.     *          Laboratories and Data:  Diagnostics:     EKG, 6/4/2025:  atrial flutter, 137/min, NSTW changes     CXR, 6/4/2025:       CONCLUSION:  Cardiomegaly.  There is pulmonary venous congestion.      There is bibasilar airspace disease with bilateral pleural effusions right greater than left.      Findings  consistent with CHF.  Superimposed bibasilar pneumonia not to be excluded.             Impression:     1.  Acute HFrEF   -LVEF reduced at 40%   -moderate pulm Hypertension     -net out = 6.1 L   -weight: 229--> 209 lbs    2. Paroxysmal Atrial Fibrillation/flutter.  RVR  (which may have triggered #1). HR's somewhat improved. Now 57-81/min  3. Hypertension . , well-controlled   4. obesity        Recommend:     1.  Telemetry  2.  Transition to po diuretic: torsemide 40 mg daily  3. HR control: toprol, dilt, dig  4. Continue op CHF meds, DOAC  5.  GDMT: losartan, BB. Follow bp's, BMP's  6. Monitor electrolytes       Likely home in am    Nigel Hagan MD             [1]    furosemide  40 mg Intravenous BID (Diuretic)    metoprolol succinate  100 mg Oral 2x Daily(Beta Blocker)    dilTIAZem  120 mg Oral BID    losartan  25 mg Oral Daily    digoxin  125 mcg Oral Daily    potassium chloride  20 mEq Oral Daily    levothyroxine  125 mcg Oral Before breakfast    spironolactone  12.5 mg Oral Daily    rivaroxaban  20 mg Oral Daily with food   [2] [3]   Allergies  Allergen Reactions    Adhesive Tape RASH

## 2025-06-09 NOTE — OCCUPATIONAL THERAPY NOTE
OCCUPATIONAL THERAPY TREATMENT NOTE - INPATIENT     Room Number: 3614/3614-A  Session: 1   Number of Visits to Meet Established Goals: 3    Presenting Problem: shortness of breath, CHF    ASSESSMENT   Patient demonstrates good  progress this session, goals updated to reflect patient performance.    Patient continues to function near baseline with lower body dressing, functional standing tolerance, and energy conservation strategies.   Contributing factors to remaining limitations include decreased endurance, medical status, and increased O2 needs from baseline.   Patient continues to benefit from continued mobilization withnursing staff. She has been educated on energy conservation and pacing strategies, and reported that her spouse can continue to assist with lower body dressing after hospital DC of needed. ADL dressing equipment instruction offered and patient declined.   No additional skilled OT needs, will sign off.     History:   History Related to Current Admission: Patient is a 79 year old female admitted on 6/4/2025 with Presenting Problem: shortness of breath, CHF. Co-Morbidities : A fib, Heart failure w/ reduced EF, HTN    WEIGHT BEARING RESTRICTION       Recommendations for nursing staff:   Transfers: 1, gait belt, ( O2)  Toileting location: bathroom     TREATMENT SESSION:  Patient Start of Session: supine    FUNCTIONAL TRANSFER ASSESSMENT  Sit to Stand: Edge of Bed  Edge of Bed: Supervision    BED MOBILITY  Supine to Sit : Supervision  Sit to Supine (OT): Supervision  Scooting: supervision    BALANCE ASSESSMENT  Static Standing: Supervision    FUNCTIONAL ADL ASSESSMENT  UB Dressing Seated: Stand-by Assist (gown changes)  LB Dressing Seated: Supervision  LB Dressing Standing: Supervision    ACTIVITY TOLERANCE: Reported some shortness of breath after functional mobility of approximately 150 feet. Oxygen saturations were in mid to upper 90s on 1L NC                         O2 SATURATIONS  Oxygen  Therapy  SPO2% on Oxygen at Rest: 93  At rest oxygen flow (liters per minute): 1  SPO2% Ambulation on Oxygen: 97  Ambulation oxygen flow (liters per minute): 1    EDUCATION PROVIDED  Patient Education : Plan of Care; DME Recommendations; Discharge Recommendations; Energy Conservation  Patient's Response to Education: Verbalized Understanding  Family/Caregiver Education : Plan of Care  Family/Caregiver's Response to Education: Verbalized Understanding    Equipment used: gait belt, O2  Demonstrates functional use, Would benefit from additional trial no     Exercises:    Exercises Repetitions Comments   Scapular elevation     Scapular retraction     Shoulder rolls     Shoulder flexion     Shoulder abduction     Shoulder internal/external rotation     Forward punch     Elbow flexion     Elbow extension     Forearm pronation/supination     Wrist flexion/extension     Gross grasp/fist pumps     Ankle pumps     Knee extension     Marching         Therapist comments: Educated patient on potential benefit of ADL dressing equipment; she politely declined and reported that her spouse, who was present, is able to continue assisting her at DE. PAtient was able to don pants and completed functional mobility for simulated household distances with CGA while on 1L O2. One brief standing rest break needed after approx 150 ft. Patient denied any ADL concerns going forward. OT educated her to consider using shower chair and to take short rest periods as needed during ADL and IADL routines during recovery.. Understanding was voiced.     Patient End of Session: All patient questions and concerns addressed, RN aware of session/findings, Call light within reach, Needs met, In bed, Family present, Alarm set    SUBJECTIVE  pleasant    PAIN ASSESSMENT  Ratin           OBJECTIVE  Precautions: Bed/chair alarm    AM-PAC ‘6-Clicks’ Inpatient Daily Activity Short Form  -   Putting on and taking off regular lower body clothing?: A Little  -    Bathing (including washing, rinsing, drying)?: A Little  -   Toileting, which includes using toilet, bedpan or urinal? : A Little  -   Putting on and taking off regular upper body clothing?: None  -   Taking care of personal grooming such as brushing teeth?: None  -   Eating meals?: None    AM-PAC Score:  Score: 21  Approx Degree of Impairment: 32.79%  Standardized Score (AM-PAC Scale): 44.27    PLAN     OT Treatment Plan: Energy conservation/work simplification techniques, ADL training, Patient/Family education, Compensatory technique education  Rehab Potential : Good  Frequency: 3x/week    OT Goals:MET 6/9/25   ADL Goals   Patient will perform lower body dressing:  with supervision  Patient will perform toileting: with supervision     Functional Transfer Goals  Patient will transfer to toilet:  with supervision     UE Exercise Program Goal  Patient will be independent with bilateral AROM HEP (home exercise program).     Additional Goals  Pt will incorporate 2 energy conservation techniques into ADL.       OT Session Time: 23 minutes  Self-Care Home Management: 23 minutes

## 2025-06-09 NOTE — PROGRESS NOTES
[facility] Hospitalist Progress Note     PCP: Roby Llamas MD    Chief Complaint: follow-up   Follow up for: The encounter diagnosis was Acute congestive heart failure, unspecified heart failure type (HCC).    Overnight/Interim Events:      SUBJECTIVE:  On 2L, did stairs, better overall, walked.     OBJECTIVE:  Temp:  [97.6 °F (36.4 °C)-98.2 °F (36.8 °C)] 98.2 °F (36.8 °C)  Pulse:  [] 76  Resp:  [14-23] 14  BP: (105-141)/(62-87) 105/69  SpO2:  [90 %-95 %] 90 %    Intake/Output:    Intake/Output Summary (Last 24 hours) at 6/9/2025 1822  Last data filed at 6/9/2025 0057  Gross per 24 hour   Intake --   Output 675 ml   Net -675 ml       Last 3 Weights   06/09/25 0404 209 lb 1.6 oz (94.8 kg)   06/08/25 0804 220 lb 3.2 oz (99.9 kg)   06/05/25 0131 229 lb 0.9 oz (103.9 kg)   01/26/23 1523 227 lb 11.8 oz (103.3 kg)   01/26/23 1039 225 lb (102.1 kg)   04/04/22 1021 220 lb (99.8 kg)       Exam    General: Alert, no distress, appears stated age.     Head:  Normocephalic, without obvious abnormality, atraumatic.   Eyes:  Sclera anicteric, EOMs intact.    Nose: Nares normal,  Mucosa normal    Throat: Lips normal   Neck: Supple, symmetrical, trachea midline   Lungs:   Clear to auscultation bilaterally. Normal effort   Chest wall:  No tenderness or deformity   Heart:  irregular rate and rhythm, S1, S2 normal, no murmur, rub or gallop appreciated   Abdomen:   Soft, NT/ND, Bowel sounds normal. No masses,  No organomegaly.    Extremities: Extremities normal, atraumatic, no cyanosis. 1+ LE edema, improved.   Skin: Skin color, texture, turgor normal. No rashes or lesions.    Neurologic: Moving all extremities spontaneously, no focal deficit appreciated      Data Review:       Labs:     Recent Labs   Lab 06/04/25  1359 06/05/25  0734 06/06/25  0657   WBC 12.9* 9.7 10.5   HGB 12.6 11.3* 11.6*   MCV 77.0* 76.8* 77.6*   .0 264.0 267.0       Recent Labs   Lab 06/04/25  1359 06/05/25  0734 06/06/25  0657 06/07/25  0555     145 144 142   K 4.1 3.3* 3.6 4.2    108 107 106   CO2 26.0 28.0 28.0 29.0   BUN 19 19 18 18   CREATSERUM 1.26* 1.09* 1.02 1.04*   CA 8.9 8.2* 8.5* 8.8   MG  --  1.7 1.8  --    * 93 104* 94       Recent Labs   Lab 06/04/25  1359   ALT 31   AST 36*   ALB 4.2       No results for input(s): \"PGLU\" in the last 168 hours.    No results for input(s): \"TROP\" in the last 168 hours.      Meds:     Scheduled Medications[1]  Medication Infusions[2]  PRN Medications[3]       Assessment/Plan:     79 year old female with PMH including but not limited to thyroid ca, HT, HTN, afib, HFpEF who p/t [facility] ED c SOB x couple months.       Acute HFpEF   - tele  - [facility] following, apprec  - IV lasix 40mg BID to torsemide 40 mg daily   - echo EF 40%, mild-mod diffuse hypokineses   - cont home meds, added arb, increased BB, dilt 120mg BID (was 180mg on prior paperwork from [employer] office)  - dig level 0.73  - daily I/Os, wts  - consider ischemic w/u as appropriate per [employer]      JON  - 2/2 above, on RA  - supp o2 as needed, walking o2 assessment p/t dc       A. fib with RVR   -Cardiology recommendations; IV cardizem  -xarelto   -[id number] =3     Hypothyroidism  -Resume home Synthroid   -free T4 high and TSH slightly low, possibly contributing to above   - decreased to 125mcg and re-check studies in 6 weeks     Hypertension  -Resume home antihypertensives as HR allows      Hypocalcemia  - follow     #hypomagnesium  -replete per protocol      Morbid obesity BMI >40  -Outpatient follow-up on resolution of above issues     # hypokalemia  -replete per protocol      SLP     Dispo: CTU    Questions/concerns were discussed with patient by bedside.     Supplementary Documentation:   DVT Mechanical Prophylaxis:   SCDs,    DVT Pharmacologic Prophylaxis   Medication    rivaroxaban (Xarelto) tab 20 mg                Code Status: Prior  Buchanan: External urinary catheter in place  Buchanan Duration (in days):   Central line:    MENDY:          **Certification      PHYSICIAN Certification of Need for Inpatient Hospitalization - Initial Certification    Patient will require inpatient services that will reasonably be expected to span two midnight's based on the clinical documentation in H+P.   Based on patients current state of illness, I anticipate that, after discharge, patient will require TBD.         Jim Campos MD  [facility]  [phone]  6/9/2025  6:23 PM         [1]    torsemide  40 mg Oral Daily    metoprolol succinate  100 mg Oral 2x Daily(Beta Blocker)    dilTIAZem  120 mg Oral BID    losartan  25 mg Oral Daily    digoxin  125 mcg Oral Daily    potassium chloride  20 mEq Oral Daily    levothyroxine  125 mcg Oral Before breakfast    spironolactone  12.5 mg Oral Daily    rivaroxaban  20 mg Oral Daily with food   [2] [3]   pantoprazole

## 2025-06-09 NOTE — SLP NOTE
SPEECH DAILY NOTE - INPATIENT    ASSESSMENT & PLAN   ASSESSMENT  Pt seen for dysphagia tx to assess tolerance with recommended diet, ensure proper utilization of aspiration precautions and provide pt/family education.  Patient seen with recommended diet of regular solids and thin liquids. Patient demonstrated safe and efficient po intake for recommended diet without any overt clinical s/s of aspiration. Patient is independent in utilization of aspiration precautions. No further skilled dysphagia intervention is warranted.       Diet Recommendations - Solids: Regular  Diet Recommendations - Liquids: Thin Liquids       Aspiration Precautions: Upright position, Slow rate, Small bites, Small sips, No straw  Medication Administration Recommendations: One pill at a time, Whole in puree    Patient Experiencing Pain: No    Treatment Plan  Treatment Plan/Recommendations: Aspiration precautions    Interdisciplinary Communication: Disussed with other staff          GOALS  Goal #1 The patient will tolerate regular consistency and thin liquids without overt signs or symptoms of aspiration with 90 % accuracy over 1-2 session(s).  met   Goal #2 The patient/family/caregiver will demonstrate understanding and implementation of aspiration precautions and swallow strategies independently over 1-2 session(s).    met   Goal #3 The patient will utilize compensatory strategies as outlined by  BSSE (clinical evaluation) including Slow rate, Small bites, Small sips, No straws, Upright 90 degrees with no assistance 90 % of the time across 1-2 sessions. Met       FOLLOW UP  Follow Up Needed (Documentation Required): No  SLP Follow-up Date: 06/09/25       Session: #1 following assessment    If you have any questions, please contact DENIA Cunningham

## 2025-06-09 NOTE — PLAN OF CARE
Assumed care at 0730  A/Ox4, RA, VSS  Tele, A fib   Cardiac diet  Denies n/v & pain   PO diuretics   1 w/ walker, voiding   Updated w/ POC  All needs met at this time    Problem: METABOLIC/FLUID AND ELECTROLYTES - ADULT  Goal: Electrolytes maintained within normal limits  Description: INTERVENTIONS:  - Monitor labs and rhythm and assess patient for signs and symptoms of electrolyte imbalances  - Administer electrolyte replacement as ordered  - Monitor response to electrolyte replacements, including rhythm and repeat lab results as appropriate  - Fluid restriction as ordered  - Instruct patient on fluid and nutrition restrictions as appropriate  Outcome: Progressing  Goal: Hemodynamic stability and optimal renal function maintained  Description: INTERVENTIONS:  - Monitor labs and assess for signs and symptoms of volume excess or deficit  - Monitor intake, output and patient weight  - Monitor urine specific gravity, serum osmolarity and serum sodium as indicated or ordered  - Monitor response to interventions for patient's volume status, including labs, urine output, blood pressure (other measures as available)  - Encourage oral intake as appropriate  - Instruct patient on fluid and nutrition restrictions as appropriate  Outcome: Progressing     Problem: CARDIOVASCULAR - ADULT  Goal: Maintains optimal cardiac output and hemodynamic stability  Description: INTERVENTIONS:  - Monitor vital signs, rhythm, and trends  - Monitor for bleeding, hypotension and signs of decreased cardiac output  - Evaluate effectiveness of vasoactive medications to optimize hemodynamic stability  - Monitor arterial and/or venous puncture sites for bleeding and/or hematoma  - Assess quality of pulses, skin color and temperature  - Assess for signs of decreased coronary artery perfusion - ex. Angina  - Evaluate fluid balance, assess for edema, trend weights  Outcome: Progressing  Goal: Absence of cardiac arrhythmias or at baseline  Description:  INTERVENTIONS:  - Continuous cardiac monitoring, monitor vital signs, obtain 12 lead EKG if indicated  - Evaluate effectiveness of antiarrhythmic and heart rate control medications as ordered  - Initiate emergency measures for life threatening arrhythmias  - Monitor electrolytes and administer replacement therapy as ordered  Outcome: Progressing     Problem: Patient/Family Goals  Goal: Patient/Family Long Term Goal  Description: Patient's Long Term Goal: dc home    Interventions:  - PO diuretics   - See additional Care Plan goals for specific interventions  Outcome: Progressing  Goal: Patient/Family Short Term Goal  Description: Patient's Short Term Goal:     Interventions:   -   - See additional Care Plan goals for specific interventions  Outcome: Progressing

## 2025-06-09 NOTE — PHYSICAL THERAPY NOTE
PHYSICAL THERAPY TREATMENT NOTE - INPATIENT    Room Number: 3614/3614-A     Session: 2       Number of Visits to Meet Established Goals: 4    Presenting Problem: worsening dyspnea  Co-Morbidities : A fib, Heart failure w/ reduced EF, HTN    PHYSICAL THERAPY ASSESSMENT   Patient demonstrates good  progress this session, goals  progressing with 1/3 met this session.      Patient currently does not meet criteria for skilled inpatient physical therapy services, however patient will remain on Inpatient Mobility Team and will continue with encouraged ambulation to maintain current level of mobility.   The rehab aide will perform treatment activities prescribed by this physical therapist. The rehab aide will communicate with overseeing PT regarding any change in functional mobility. RN aware.    PLAN DURING HOSPITALIZATION  Nursing Mobility Recommendation : 1 Assist  PT Device Recommendation: Rolling walker  PT Treatment Plan: Stair training, Gait training, Energy conservation, Transfer training  Frequency (Obs):  (2-3x / wk)     CURRENT GOALS     Goal #1 Patient is able to demonstrate transfers Sit to/from Stand at assistance level: supervision   GOAL MET   Goal #2 Patient is able to ambulate 150 feet with assist device: LRAD at assistance level: supervision   goal met   Goal #3 Patient is able to navigate 1 flight of stairs w/ assist device: LRAD at assistance level: supervision  Goal partially met- demos 2 stairs, denies concerns   Goal #4     Goal #5     Goal Comments: Goals established on 6/5/2025, progressing 6/7/25.        PHYSICAL THERAPY MEDICAL/SOCIAL HISTORY  History related to current admission: Patient is a 79 year old female admitted on 6/4/2025 for worsening dyspnea.      HOME SITUATION  Type of Home: House  Home Layout: Two level  Stairs to Enter : 2 (2 JIMI from front door, 3 JIMI from garage)        Stairs to Bedroom: 12    Railing: Yes    Lives With: Spouse                Prior Level of [place]: IND  w/ functional mobility and ADLs.       SUBJECTIVE  \"I am feeling better.\" \"I don't feel dizzy\" when patient changes positions.     OBJECTIVE  Precautions: Bed/chair alarm    WEIGHT BEARING RESTRICTION     PAIN ASSESSMENT   Ratin          BALANCE                                                                                                                       Static Sitting: Good  Dynamic Sitting: Good           Static Standing: Fair +  Dynamic Standing: Fair +    ACTIVITY TOLERANCE                         O2 WALK  Oxygen Therapy  Ambulation oxygen flow (liters per minute): 2    AM-PAC '6-Clicks' INPATIENT SHORT FORM - BASIC MOBILITY  How much difficulty does the patient currently have...  Patient Difficulty: Turning over in bed (including adjusting bedclothes, sheets and blankets)?: None   Patient Difficulty: Sitting down on and standing up from a chair with arms (e.g., wheelchair, bedside commode, etc.): A Little   Patient Difficulty: Moving from lying on back to sitting on the side of the bed?: None   How much help from another person does the patient currently need...   Help from Another: Moving to and from a bed to a chair (including a wheelchair)?: A Little   Help from Another: Need to walk in hospital room?: A Little   Help from Another: Climbing 3-5 steps with a railing?: A Little     AM-PAC Score:  Raw Score: 20   Approx Degree of Impairment: 35.83%   Standardized Score (AM-PAC Scale): 47.67   CMS Modifier (G-Code): CJ    FUNCTIONAL ABILITY STATUS  Gait Assessment   Functional Mobility/Gait Assessment  Gait Assistance: Contact guard assist, Supervision  Distance (ft): 145 ft x 1, 75 ft x 1  Assistive Device: None  Pattern: Within Functional Limits  Stairs: Stairs  How Many Stairs: 2  Device: 1 Rail  Assist: Contact guard assist  Pattern: Ascend and Descend  Ascend and Descend : Step to    Skilled Therapy Provided    Bed Mobility:  Rolling: SUP   Supine<>Sit: SUP   Sit<>Supine: SUP     Transfer  Mobility:  Sit<>Stand: SUP   Stand<>Sit: SUP   Gait: supervision for 50 ft to stair well, performed 2 stairs w/ s using step-to gait w/ railing. Continued to walk 95 ft w/ supervision. Took 1 minute sitting break. Performed an additional 75 ft w/ CGA.    Therapist's Comments: Able to perform 2 stairs as mentioned above. Pt required to take a 1 minute sitting resting break due to muscle fatigue after ambulating 145 ft and performing 2 stairs. Able to walk after sitting break back to room. Educated to get OOB and walk w/ nursing staff.       Patient End of Session: In bed, Needs met, Call light within reach, RN aware of session/findings, All patient questions and concerns addressed, Family present    PT Session Time: 15 minutes  Therapeutic Activity: 15 minutes     No

## 2025-06-09 NOTE — PROGRESS NOTES
Received pt. In bed on room air.  Was dropping O2 sats to med to low 80's and 2LNC placed and with good results.  Remains in controlled a fib with rates that decrease on occasion to the high 40's and bounces back up.  Pur Wick in place.  Up with SB and a walker.  Denies any pain.  Safety measures in place.  Resting comfortably.

## 2025-06-09 NOTE — PROGRESS NOTES
Provider Clarification  Additional information on the patient's acute on chronic congestive heart failure  Acute on chronic systolic CHF (HFrEF)   This note is part of the patient's medical record.

## 2025-06-09 NOTE — PLAN OF CARE
Denied any resp issues.  O2 sats dropped during sleep.  O2 2L placed on pt.  Going to [facility] today.

## 2025-06-10 PROCEDURE — 97530 THERAPEUTIC ACTIVITIES: CPT

## 2025-06-10 RX ORDER — LEVOTHYROXINE SODIUM 125 UG/1
125 TABLET ORAL
Qty: 30 TABLET | Refills: 0 | Status: SHIPPED | OUTPATIENT
Start: 2025-06-11

## 2025-06-10 RX ORDER — DIGOXIN 125 MCG
125 TABLET ORAL DAILY
Qty: 90 TABLET | Refills: 1 | Status: SHIPPED | OUTPATIENT
Start: 2025-06-11

## 2025-06-10 RX ORDER — METOPROLOL SUCCINATE 100 MG/1
100 TABLET, EXTENDED RELEASE ORAL
Qty: 90 TABLET | Refills: 1 | Status: SHIPPED | OUTPATIENT
Start: 2025-06-10

## 2025-06-10 RX ORDER — PANTOPRAZOLE SODIUM 40 MG/1
40 TABLET, DELAYED RELEASE ORAL
Status: SHIPPED | COMMUNITY
Start: 2025-06-10

## 2025-06-10 RX ORDER — DOCUSATE SODIUM 100 MG/1
100 CAPSULE, LIQUID FILLED ORAL 2 TIMES DAILY
Status: DISCONTINUED | OUTPATIENT
Start: 2025-06-10 | End: 2025-06-10

## 2025-06-10 RX ORDER — LOSARTAN POTASSIUM 25 MG/1
25 TABLET ORAL DAILY
Qty: 90 TABLET | Refills: 1 | Status: SHIPPED | OUTPATIENT
Start: 2025-06-11

## 2025-06-10 RX ORDER — BISACODYL 10 MG
10 SUPPOSITORY, RECTAL RECTAL
Status: DISCONTINUED | OUTPATIENT
Start: 2025-06-10 | End: 2025-06-10

## 2025-06-10 RX ORDER — POTASSIUM CHLORIDE 1500 MG/1
20 TABLET, EXTENDED RELEASE ORAL DAILY
Qty: 90 TABLET | Refills: 1 | Status: SHIPPED | OUTPATIENT
Start: 2025-06-11

## 2025-06-10 RX ORDER — POLYETHYLENE GLYCOL 3350 17 G/17G
17 POWDER, FOR SOLUTION ORAL DAILY PRN
Status: DISCONTINUED | OUTPATIENT
Start: 2025-06-10 | End: 2025-06-10

## 2025-06-10 RX ORDER — DILTIAZEM HYDROCHLORIDE 120 MG/1
120 TABLET, FILM COATED ORAL 2 TIMES DAILY
Qty: 180 TABLET | Refills: 1 | Status: SHIPPED | OUTPATIENT
Start: 2025-06-10

## 2025-06-10 NOTE — PROGRESS NOTES
[facility] Cardiology Progress Note        Sariah Montague Patient Status:  Inpatient    10/18/1945 MRN [id number]   Location [facility] 3NE-A Attending Jim Campos MD   Hosp Day # 6 PCP Roby Llamas MD     Subjective:  Patient denies chest pain and dyspnea.  Feels at baseline or better with her breathing and swelling  Able to walk in the hallways without difficulty    Medications:  Scheduled Medications[1]    Continuous Infusions:  Medication Infusions[2]      Allergies:  Allergies[3]      Objective:    Intake/Output:      Intake/Output Summary (Last 24 hours) at 6/10/2025 1422  Last data filed at 6/10/2025 0452  Gross per 24 hour   Intake --   Output 525 ml   Net -525 ml     Wt Readings from Last 3 Encounters:   06/10/25 209 lb 3.2 oz (94.9 kg)   23 227 lb 11.8 oz (103.3 kg)   22 220 lb (99.8 kg)       Physical Exam:        Vitals:    06/10/25 0700 06/10/25 0837 06/10/25 0925 06/10/25 1227   BP:  133/79  108/55   BP Location:  Left arm  Left arm   Pulse: 70 78 74 54   Resp: 17 20  21   Temp:  98.1 °F (36.7 °C)  98.4 °F (36.9 °C)   TempSrc:  Oral  Oral   SpO2: 92% 95%  91%   Weight:       Height:           Temp:  [97.7 °F (36.5 °C)-98.4 °F (36.9 °C)] 98.4 °F (36.9 °C)  Pulse:  [54-80] 54  Resp:  [14-26] 21  BP: (105-133)/(48-97) 108/55  SpO2:  [90 %-100 %] 91 %      Temp: 98.4 °F (36.9 °C)  Pulse: 54  Resp: 21  BP: 108/55  General:  Appears comfortable  HEENT: No focal deficits.  Neck: No JVD, carotids 2+ no bruits.  Cardiac: Irregular S1S2.  No S3, S4, rub, click.  No murmur.  Lungs: Clear to auscultation and percussion.  Abdomen: Soft, non-tender.   Extremities: 1 + bilateral LE edema.  No clubbing or cyanosis.    Neurologic: Alert and oriented, normal affect.  Skin: Warm and dry.           LABS:      HEM:  Recent Labs   Lab 25  1359 25  0734 25  0657   WBC 12.9* 9.7 10.5   HGB 12.6 11.3* 11.6*   HCT 41.6 36.8 38.2   .0 264.0 267.0        Chem:  Recent Labs   Lab 06/04/25  1359 06/05/25  0734 06/06/25  0657 06/07/25  0555    145 144 142   K 4.1 3.3* 3.6 4.2    108 107 106   CO2 26.0 28.0 28.0 29.0   BUN 19 19 18 18   CREATSERUM 1.26* 1.09* 1.02 1.04*   CA 8.9 8.2* 8.5* 8.8   MG  --  1.7 1.8  --    * 93 104* 94       Recent Labs   Lab 06/04/25  1359   ALT 31   AST 36*   ALB 4.2       No results for input(s): \"PT\", \"PTT\", \"INR\" in the last 168 hours.        Lab Results   Component Value Date    TROP 0.147 () 01/11/2017    TROP 0.101 () 11/04/2016    TROP 0.184 () 02/05/2016         Invalid input(s): \"PBNPML\"               Diagnostics:   Telemetry: Atrial Fibrillation /flutter, 100    EKG, 6/5/2025,     Echo, 6/5/25:    1. Left ventricle: The cavity size was normal. Wall thickness was normal.      Systolic function was mildly to moderately reduced. The estimated      ejection fraction was 40%, by visual assessment. There was mild -      moderate diffuse hypokinesis with no identifiable regional variations.      Unable to assess LV diastolic function due to heart rhythm.   2. Right ventricle: The cavity size was moderately increased. Systolic      function was normal.   3. Left atrium: The left atrial volume was moderately increased.   4. Right atrium: The atrium was moderately dilated.   5. Mitral valve: The annulus was mildly to moderately calcified. Systolic      bowing without prolapse, involving the anterior leaflet and the posterior      leaflet. There was mild to moderate regurgitation.   6. Tricuspid valve: There was severe regurgitation.   7. Pulmonary arteries: Systolic pressure was mildly to moderately increased,      in the range of 45mm Hg to 50mm Hg.     *          Laboratories and Data:  Diagnostics:     EKG, 6/4/2025:  atrial flutter, 137/min, NSTW changes     CXR, 6/4/2025:       CONCLUSION:  Cardiomegaly.  There is pulmonary venous congestion.      There is bibasilar airspace disease with bilateral pleural  effusions right greater than left.      Findings consistent with CHF.  Superimposed bibasilar pneumonia not to be excluded.             Impression:     1.  Acute HFrEF   -LVEF reduced at 40%   -moderate pulm Hypertension     -net out = 6.4 L   -weight: 229--> 209 lbs   -room air  2. Paroxysmal Atrial Fibrillation/flutter.   - RVR  (which may have triggered #1).   - Rate controlled. Now 50's-70's/min  3. Hypertension   -well-controlled   4. Obesity        Recommend:    1. Telemetry  2. Continue torsemide 40 mg daily  3. HR control: toprol, dilt, dig  4. Continue op CHF meds, DOAC  5. GDMT: losartan, BB. spironolactone Follow bp's, BMP's  6. Monitor electrolytes  7. AC: Xarelto  8. OK to discharge from cardiology perspective. Close follow up as OP    DEBBI Thompson  6/10/2025  12:52 PM               [1]    docusate sodium  100 mg Oral BID    torsemide  40 mg Oral Daily    metoprolol succinate  100 mg Oral 2x Daily(Beta Blocker)    dilTIAZem  120 mg Oral BID    losartan  25 mg Oral Daily    digoxin  125 mcg Oral Daily    potassium chloride  20 mEq Oral Daily    levothyroxine  125 mcg Oral Before breakfast    spironolactone  12.5 mg Oral Daily    rivaroxaban  20 mg Oral Daily with food   [2] [3]   Allergies  Allergen Reactions    Adhesive Tape RASH

## 2025-06-10 NOTE — PLAN OF CARE
NURSING DISCHARGE NOTE    Discharged Home via Wheelchair.  Accompanied by Support staff  Belongings Taken by patient/family.  IV removed  AVS reviewed  All questions answered

## 2025-06-10 NOTE — PLAN OF CARE
Assumed care at 1930  1L Nasal cannula  A&OX4  Afib on tele (40-90s)  Cardiac electrolyte replacement  Cardiac diet, meds whole   Pure wick for urgency, continent x2  Denies pain  Up w/ SBA w/ RW  RPIV flushed, saline locked, capped, dressing c/d/I  All needs met at this time, call light within reach, bed lowered and locked, plan of care ongoing          Problem: CARDIOVASCULAR - ADULT  Goal: Maintains optimal cardiac output and hemodynamic stability  Description: INTERVENTIONS:  - Monitor vital signs, rhythm, and trends  - Monitor for bleeding, hypotension and signs of decreased cardiac output  - Evaluate effectiveness of vasoactive medications to optimize hemodynamic stability  - Monitor arterial and/or venous puncture sites for bleeding and/or hematoma  - Assess quality of pulses, skin color and temperature  - Assess for signs of decreased coronary artery perfusion - ex. Angina  - Evaluate fluid balance, assess for edema, trend weights  Outcome: Progressing  Goal: Absence of cardiac arrhythmias or at baseline  Description: INTERVENTIONS:  - Continuous cardiac monitoring, monitor vital signs, obtain 12 lead EKG if indicated  - Evaluate effectiveness of antiarrhythmic and heart rate control medications as ordered  - Initiate emergency measures for life threatening arrhythmias  - Monitor electrolytes and administer replacement therapy as ordered  Outcome: Progressing     Problem: METABOLIC/FLUID AND ELECTROLYTES - ADULT  Goal: Electrolytes maintained within normal limits  Description: INTERVENTIONS:  - Monitor labs and rhythm and assess patient for signs and symptoms of electrolyte imbalances  - Administer electrolyte replacement as ordered  - Monitor response to electrolyte replacements, including rhythm and repeat lab results as appropriate  - Fluid restriction as ordered  - Instruct patient on fluid and nutrition restrictions as appropriate  Outcome: Progressing  Goal: Hemodynamic stability and optimal renal  function maintained  Description: INTERVENTIONS:  - Monitor labs and assess for signs and symptoms of volume excess or deficit  - Monitor intake, output and patient weight  - Monitor urine specific gravity, serum osmolarity and serum sodium as indicated or ordered  - Monitor response to interventions for patient's volume status, including labs, urine output, blood pressure (other measures as available)  - Encourage oral intake as appropriate  - Instruct patient on fluid and nutrition restrictions as appropriate  Outcome: Progressing

## 2025-06-10 NOTE — DISCHARGE SUMMARY
[facility] Internal Medicine Hospitalist Discharge Summary     Patient ID:  Sariah Montague  79 year old  10/18/1945    Admission Date/Time  6/4/2025  1:41 PM  Discharge Date  06/10/25    PCP  Roby Llamas MD     Discharging Hospitalist:  Jim Campos MD    Disposition:  Stable     Follow Up Appointments  Annie Mckeon, APRN  [address]  [address]  [facility] [address]  [phone]    Go on 6/24/2025  Office visit at 11:30 am with heart failure nurse practitioner      Primary Hospital Problems/Hospital Course Summary  Acute HFpEF improved c IV lasix    Medication Changes  decreased synthroid to 125mcg; re-check studies in 4-6 weeks       Hospital Course/Secondary Diagnoses:      79 year old female with PMH including but not limited to thyroid ca, HT, HTN, afib, HFpEF who p/t E[facility] ED c SOB x couple months.         Acute HFpEF   - tele  - cards following, apprec  - IV lasix 40mg BID to torsemide 40 mg daily   - echo EF 40%, mild-mod diffuse hypokineses   - cont home meds, added arb, increased BB, dilt 120mg BID, spironolactone   - dig level 0.73  - daily I/Os, wts  - consider ischemic w/u as appropriate per cards       JON  - 2/2 above, on RA  - supp o2 as needed, walking o2 assessment p/t dc         A. fib with RVR   -Cardiology recommendations; IV cardizem  -xarelto   -[id number] =3     Hypothyroidism  -Resume home Synthroid   -free T4 high and TSH slightly low, possibly contributing to above   - decreased to 125mcg and re-check studies in 6 weeks     Hypertension  -Resume home antihypertensives as HR allows      Hypocalcemia  - follow      #hypomagnesium  -replete per protocol        Morbid obesity BMI >40  -Outpatient follow-up on resolution of above issues     # hypokalemia  -replete per protocol        SLP   Diet Recommendations - Solids: Regular  Diet Recommendations - Liquids: Thin Liquids, no straws    Consults: IP CONSULT TO  HOSPITALIST  IP CONSULT TO CARDIOLOGY  IP CONSULT TO FOOD AND NUTRITION SERVICES    Discharge Medications       Medication List        START taking these medications      digoxin 0.125 MG Tabs  Commonly known as: Lanoxin  Take 1 tablet (125 mcg total) by mouth daily.  Start taking on: June 11, 2025     dilTIAZem HCl 120 MG Tabs  Commonly known as: CARDIZEM  Take 1 tablet (120 mg total) by mouth in the morning and 1 tablet (120 mg total) before bedtime.     Torsemide 40 MG Tabs  Take 40 mg by mouth daily.  Start taking on: June 11, 2025            CHANGE how you take these medications      losartan 25 MG Tabs  Commonly known as: Cozaar  Take 1 tablet (25 mg total) by mouth daily.  Start taking on: June 11, 2025  What changed:   medication strength  how much to take     metoprolol succinate  MG Tb24  Commonly known as: Toprol XL  Take 1 tablet (100 mg total) by mouth 2x Daily(Beta Blocker).  What changed:   how much to take  how to take this  when to take this  additional instructions     potassium chloride 20 MEQ Tbcr  Commonly known as: Klor-Con M20  Take 1 tablet (20 mEq total) by mouth daily.  Start taking on: June 11, 2025  What changed: how much to take     rivaroxaban 20 MG Tabs  Commonly known as: Xarelto  Start taking on: June 11, 2025  What changed: when to take this            CONTINUE taking these medications      levothyroxine 150 MCG Tabs  Commonly known as: Synthroid     pantoprazole 40 MG Tbec  Commonly known as: Protonix     spironolactone 25 MG Tabs  Commonly known as: Aldactone            STOP taking these medications      dilTIAZem HCl ER Coated Beads 180 MG Cp24  Commonly known as: CARDIZEM CD     furosemide 20 MG Tabs  Commonly known as: Lasix               Where to Get Your Medications        These medications were sent to [id number] - [place] - [address] AT [facility] [address], [phone], [phone]  [address], [place] [address]      Hours:  24-hours Phone: [phone]   digoxin 0.125 MG Tabs  dilTIAZem HCl 120 MG Tabs  losartan 25 MG Tabs  metoprolol succinate  MG Tb24  potassium chloride 20 MEQ Tbcr  Torsemide 40 MG Tabs       I reconciled current and discharge medications on the day of discharge.    Imaging/Diagnostic Reports  CARD ECHO 2D DOPPLER (CPT=93306)  Result Date: 2025  Transthoracic Echocardiogram Name:Sariah Montague Date: 2025 :  10/18/1945 Ht:  (63in)  BP: 100 / 79 MRN:  [id number]    Age:  79years    Wt:  (229lb) HR: 113bpm Loc:  [facility]       Gndr: F          BSA: 2.05m^2 Sonographer: Lora GARCIA Ordering:    Nigel Hagan MD Consulting:  Nigel Hagan ---------------------------------------------------------------------------- History/Indications:   Congestive heart failure. ---------------------------------------------------------------------------- Procedure information:  A transthoracic complete 2D study was performed. Additional evaluation included M-mode, complete spectral Doppler, and color Doppler.  Patient status:  Inpatient.  Location:  [place] [address].    Comparison was made to the study of 2023.    This was a routine study. Transthoracic echocardiography for ventricular function evaluation and assessment of valvular function. Image quality was adequate. ECG rhythm:   Atrial fibrillation ---------------------------------------------------------------------------- Conclusions: 1. Left ventricle: The cavity size was normal. Wall thickness was normal.    Systolic function was mildly to moderately reduced. The estimated    ejection fraction was 40%, by visual assessment. There was mild -    moderate diffuse hypokinesis with no identifiable regional variations.    Unable to assess LV diastolic function due to heart rhythm. 2. Right ventricle: The cavity size was moderately increased. Systolic    function was normal. 3. Left atrium: The left atrial volume was moderately increased. 4.  Right atrium: The atrium was moderately dilated. 5. Mitral valve: The annulus was mildly to moderately calcified. Systolic    bowing without prolapse, involving the anterior leaflet and the posterior    leaflet. There was mild to moderate regurgitation. 6. Tricuspid valve: There was severe regurgitation. 7. Pulmonary arteries: Systolic pressure was mildly to moderately increased,    in the range of 45mm Hg to 50mm Hg. Impressions:  This study is compared with previous dated 01/26/2023: The tricuspid regurgitation is worse with signs of right heart overload and enlargement. One might consider transesophageal echocardiogram and/or RHC for evaluation of potential valvular therapies if clinically indicated. * ---------------------------------------------------------------------------- * Findings: Left ventricle:  The cavity size was normal. Wall thickness was normal. Systolic function was mildly to moderately reduced. The estimated ejection fraction was 40%, by visual assessment. There was mild - moderate diffuse hypokinesis with no identifiable regional variations. Unable to assess LV diastolic function due to heart rhythm. Left atrium:  The atrium was moderately dilated. The left atrial volume was moderately increased. Right ventricle:  The cavity size was moderately increased. Systolic function was normal. Right atrium:  The atrium was moderately dilated. Mitral valve:  The annulus was mildly to moderately calcified. Leaflet separation was normal.  Systolic bowing without prolapse, involving the anterior leaflet and the posterior leaflet.  Doppler:  Transvalvular velocity was within the normal range. There was no evidence for stenosis. There was mild to moderate regurgitation. Aortic valve:  The valve was structurally normal. The valve was trileaflet. Cusp separation was normal.  Doppler:  Transvalvular velocity was within the normal range. There was no evidence for stenosis. There was trivial regurgitation.  Tricuspid valve:  The valve is structurally normal. Leaflet separation was normal.  Doppler:  Transvalvular velocity was within the normal range. There was no evidence for stenosis. There was severe regurgitation. Pulmonic valve:   The valve is structurally normal. Cusp separation was normal.  Doppler:  Transvalvular velocity was within the normal range. There was no evidence for stenosis. There was mild regurgitation. Pericardium:   There was no pericardial effusion. Aorta: Aortic root: The aortic root was normal. Ascending aorta: The ascending aorta was normal. Pulmonary arteries: Systolic pressure was mildly to moderately increased, in the range of 45mm Hg to 50mm Hg. Systemic veins:  Central venous respirophasic diameter changes are in the normal range (>50%). Inferior vena cava: The IVC was normal-sized. ---------------------------------------------------------------------------- Measurements  Left ventricle         Value        Ref       11/04/2016  IVS thickness, ED,     0.8   cm     0.6 - 0.9 0.8  PLAX  LV ID, ED, PLAX        5.0   cm     3.8 - 5.2 ----------  LV ID, ES, PLAX    (H) 3.9   cm     2.2 - 3.5 3.0  LV PW thickness,       0.8   cm     0.6 - 0.9 ----------  ED, PLAX  IVS/LV PW ratio,       0.98         --------- ----------  ED, PLAX  LV PW/LV ID ratio,     0.16         --------- ----------  ED, PLAX  LV ejection        (L) 43    %      54 - 74   77  fraction  Aortic root            Value        Ref       11/04/2016  Aortic root ID, ED     3.0   cm     2.7 - 4.2 ----------  Ascending aorta        Value        Ref       11/04/2016  Ascending aorta        3.0   cm     1.9 - 3.5 ----------  ID, A-P, ED  Left atrium            Value        Ref       11/04/2016  LA ID, A-P, ES     (H) 4.7   cm     2.7 - 3.8 5.1  LA volume, S       (H) 115   ml     22 - 52   111  LA volume/bsa, S   (H) 56    ml/m^2 16 - 34   54  LA volume, ES, 1-p (H) 104   ml     22 - 52   105  A4C  LA volume, ES, 1-p (H) 112   ml     22  - 52   108  A2C  LA volume, ES, A/L     123   ml     --------- ----------  LA volume/bsa, ES, (H) 60    ml/m^2 16 - 34   ----------  A/L  LA/aortic root         1.57         --------- ----------  ratio  Pulmonary artery       Value        Ref       11/04/2016  PA pressure, S, DP     47    mm Hg  --------- ----------  Tricuspid valve        Value        Ref       11/04/2016  Tricuspid regurg   (H) 3.3   m/sec  <=2.8     2.87  peak velocity  Tricuspid peak         44    mm Hg  --------- 35  RV-RA gradient  Right atrium           Value        Ref       11/04/2016  RA ID, S-I, ES,    (H) 6.3   cm     3.4 - 5.3 ----------  A4C  RA ID/bsa, S-I,        3.1   cm/m^2 1.9 - 3.1 ----------  ES, A4C  RA area, ES, A4C   (H) 27    cm^2   10 - 18   ----------  RA volume, ES, 1-p     92    ml     --------- ----------  A4C  RA volume/bsa, ES, (H) 45    ml/m^2 9 - 33    ----------  1-p A4C  Systemic veins         Value        Ref       11/04/2016  Estimated CVP          3     mm Hg  --------- 5  Right ventricle        Value        Ref       11/04/2016  RV end-diastolic       149   ml     --------- ----------  volume, 3D  RV end-systolic        88    ml     --------- ----------  volume, 3D  RV stroke volume,      61    ml     --------- ----------  3D  RV ejection        (L) 41    %      57 - 82   ----------  fraction, 3D  RV end-diastolic   (H) 73    ml/m^2 25 - 62   ----------  volume/bsa, 3D  RV end-systolic    (H) 43    ml/m^2 7 - 21    ----------  volume/bsa, 3D  RV stroke volume,      30    ml/m^2 --------- ----------  3D  TAPSE, 2D              1.74  cm     >=1.70    ----------  RV pressure, S, DP     47    mm Hg  --------- 40 Legend: (L)  and  (H)  lisa values outside specified reference range. ---------------------------------------------------------------------------- Prepared and electronically signed by Hira Alvarez MD 06/05/2025 17:50     XR CHEST AP PORTABLE  (CPT=71045)  Result Date: 6/4/2025  PROCEDURE:  XR CHEST AP  PORTABLE  (CPT=71045)  TECHNIQUE:  AP chest radiograph was obtained.  COMPARISON:  [facility] , XR, XR CHEST AP PORTABLE  (CPT=71045), 1/26/2023, 11:39 AM.  INDICATIONS:  ANDREA for a month  PATIENT STATED HISTORY: (As transcribed by Technologist)  Patient states she has difficulty breathing but no chest pain.                 CONCLUSION:  Cardiomegaly.  There is pulmonary venous congestion.  There is bibasilar airspace disease with bilateral pleural effusions right greater than left.  Findings consistent with CHF.  Superimposed bibasilar pneumonia not to be excluded.   LOCATION:  [id number]      Dictated by (CST): Brianne Mayer MD on 6/04/2025 at 3:02 PM     Finalized by (CST): Brianne Mayer MD on 6/04/2025 at 3:06 PM           Patient instructions:      I as the attending physician reconciled the current and discharge medications on day of discharge.     Current Discharge Medication List        START taking these medications    Details   dilTIAZem 120 MG Oral Tab Take 1 tablet (120 mg total) by mouth in the morning and 1 tablet (120 mg total) before bedtime.      digoxin 0.125 MG Oral Tab Take 1 tablet (125 mcg total) by mouth daily.      torsemide 40 MG Oral Tab Take 40 mg by mouth daily.           CONTINUE these medications which have CHANGED    Details   rivaroxaban 20 MG Oral Tab Take 1 tablet (20 mg total) by mouth daily with food.      metoprolol succinate  MG Oral Tablet 24 Hr Take 1 tablet (100 mg total) by mouth 2x Daily(Beta Blocker).      potassium chloride 20 MEQ Oral Tab CR Take 1 tablet (20 mEq total) by mouth daily.      losartan 25 MG Oral Tab Take 1 tablet (25 mg total) by mouth daily.      pantoprazole 40 MG Oral Tab EC Take 1 tablet (40 mg total) by mouth daily as needed.           CONTINUE these medications which have NOT CHANGED    Details   spironolactone 25 MG Oral Tab Take 0.5 tablets (12.5 mg total) by mouth daily.      levothyroxine 150 MCG Oral Tab Take 1 tablet (150 mcg total) by mouth in  the morning.           STOP taking these medications       furosemide 20 MG Oral Tab        dilTIAZem  MG Oral Capsule SR 24 Hr                Exam on day of discharge:     Vitals:    06/10/25 1227   BP: 108/55   Pulse: 54   Resp: 21   Temp: 98.4 °F (36.9 °C)       Physical Exam:   No cp/sob. Walked.     General: no acute distress  Heart: RRR  Lungs: CTAB  Abd: Soft, NT, ND  LE swelling improved   Neuro: no focal deficits      Total time coordinating care for discharge: Greater than 30 minutes. D/w RN     Jim Campos MD  [facility]  [phone]  6/10/2025  3:26 PM

## 2025-06-10 NOTE — PROGRESS NOTES
HF  rounded on the patient today. Key HF education topics--diet, medication adherence, daily weights, fluid restriction, and symptom recognition--were reviewed and reinforced. Follow-up appointment discussed; no immediate concerns identified. Patient encouraged to continue current regimen and to notify nursing staff with any questions or re-education needs.    Rosemarie Cadet RN  HF   Extension [phone]

## 2025-06-10 NOTE — PLAN OF CARE
Assumed care at 0730  A/Ox4, RA, VSS  Tele, A fib   Denies n/v & pain   Cardiac diet  Continent, SBA  Updated w/ POC  All needs met at this time    Problem: METABOLIC/FLUID AND ELECTROLYTES - ADULT  Goal: Electrolytes maintained within normal limits  Description: INTERVENTIONS:  - Monitor labs and rhythm and assess patient for signs and symptoms of electrolyte imbalances  - Administer electrolyte replacement as ordered  - Monitor response to electrolyte replacements, including rhythm and repeat lab results as appropriate  - Fluid restriction as ordered  - Instruct patient on fluid and nutrition restrictions as appropriate  Outcome: Progressing  Goal: Hemodynamic stability and optimal renal function maintained  Description: INTERVENTIONS:  - Monitor labs and assess for signs and symptoms of volume excess or deficit  - Monitor intake, output and patient weight  - Monitor urine specific gravity, serum osmolarity and serum sodium as indicated or ordered  - Monitor response to interventions for patient's volume status, including labs, urine output, blood pressure (other measures as available)  - Encourage oral intake as appropriate  - Instruct patient on fluid and nutrition restrictions as appropriate  Outcome: Progressing     Problem: CARDIOVASCULAR - ADULT  Goal: Maintains optimal cardiac output and hemodynamic stability  Description: INTERVENTIONS:  - Monitor vital signs, rhythm, and trends  - Monitor for bleeding, hypotension and signs of decreased cardiac output  - Evaluate effectiveness of vasoactive medications to optimize hemodynamic stability  - Monitor arterial and/or venous puncture sites for bleeding and/or hematoma  - Assess quality of pulses, skin color and temperature  - Assess for signs of decreased coronary artery perfusion - ex. Angina  - Evaluate fluid balance, assess for edema, trend weights  Outcome: Progressing     Problem: CARDIOVASCULAR - ADULT  Goal: Absence of cardiac arrhythmias or at  baseline  Description: INTERVENTIONS:  - Continuous cardiac monitoring, monitor vital signs, obtain 12 lead EKG if indicated  - Evaluate effectiveness of antiarrhythmic and heart rate control medications as ordered  - Initiate emergency measures for life threatening arrhythmias  - Monitor electrolytes and administer replacement therapy as ordered  Outcome: Progressing

## 2025-06-10 NOTE — PHYSICAL THERAPY NOTE
PHYSICAL THERAPY TREATMENT NOTE - INPATIENT    Room Number: 3614/3614-A     Session: 3     Number of Visits to Meet Established Goals: 4    Presenting Problem: worsening dyspnea  Co-Morbidities : A fib, Heart failure w/ reduced EF, HTN    PHYSICAL THERAPY ASSESSMENT   Patient demonstrates good  progress this session, goals  progressing with 1/3 met this session.       Patient currently does not meet criteria for skilled inpatient physical therapy services, however patient will remain on Inpatient Mobility Team and will continue with encouraged ambulation to maintain current level of mobility.   The rehab aide will perform treatment activities prescribed by this physical therapist. The rehab aide will communicate with overseeing PT regarding any change in functional mobility. RN aware.      Patient continues to benefit from continued skilled PT services: at discharge to promote prior level of function.  Anticipate patient will return home with home health PT.    PLAN DURING HOSPITALIZATION  Nursing Mobility Recommendation : 1 Assist  PT Device Recommendation: Rolling walker  PT Treatment Plan: Stair training, Gait training, Energy conservation, Transfer training  Frequency (Obs):  (2-3x / wk)     CURRENT GOALS     Goal #1 Patient is able to demonstrate transfers Sit to/from Stand at assistance level: supervision   GOAL MET   Goal #2 Patient is able to ambulate 150 feet with assist device: LRAD at assistance level: supervision   goal met   Goal #3 Patient is able to navigate 1 flight of stairs w/ assist device: LRAD at assistance level: supervision  Goal partially met- demos 12 stairs, denies concerns with CGA level   Goal #4     Goal #5     Goal Comments: Goals established on 2025, progressing 25.  6/10/2025 all goals ongoing  SUBJECTIVE  \"I feel better\"    OBJECTIVE  Precautions: Bed/chair alarm    WEIGHT BEARING RESTRICTION     PAIN ASSESSMENT   Ratin  Location: Pt reports no pain       BALANCE                                                                                                                        Static Sitting: Good  Dynamic Sitting: Good           Static Standing: Fair +  Dynamic Standing: Fair +    ACTIVITY TOLERANCE                         O2 WALK       AM-PAC '6-Clicks' INPATIENT SHORT FORM - BASIC MOBILITY  How much difficulty does the patient currently have...  Patient Difficulty: Turning over in bed (including adjusting bedclothes, sheets and blankets)?: None   Patient Difficulty: Sitting down on and standing up from a chair with arms (e.g., wheelchair, bedside commode, etc.): None   Patient Difficulty: Moving from lying on back to sitting on the side of the bed?: None   How much help from another person does the patient currently need...   Help from Another: Moving to and from a bed to a chair (including a wheelchair)?: None   Help from Another: Need to walk in hospital room?: A Little   Help from Another: Climbing 3-5 steps with a railing?: A Little     AM-PAC Score:  Raw Score: 22   Approx Degree of Impairment: 20.91%   Standardized Score (AM-PAC Scale): 53.28   CMS Modifier (G-Code): CJ    FUNCTIONAL ABILITY STATUS  Gait Assessment   Functional Mobility/Gait Assessment  Gait Assistance: Supervision  Distance (ft): 40  Assistive Device: None  Pattern: Within Functional Limits  Stairs: Stairs  How Many Stairs: 12  Device: 1 Rail  Assist: Supervision  Pattern: Ascend and Descend  Ascend and Descend : Step to    Skilled Therapy Provided    Bed Mobility:  Rolling: NT   Supine<>Sit: MI   Sit<>Supine: MI     Transfer Mobility:  Sit<>Stand: MI   Stand<>Sit: MI   Gait: Sup 40ft no assisted device    Therapist's Comments: Pt was observed with no LOB when ambulating. Pt performed 12 steps with reciprocal gait using 1 railing and CGA. Pt was educated on energy conservation techniques to safely complete towers.       Patient End of Session: In bed, Needs met, Call light within reach, RN aware of  session/findings, All patient questions and concerns addressed, Family present    PT Session Time: 15 minutes  Therapeutic Activity: 15 minutes

## 2025-06-10 NOTE — CM/SW NOTE
ALINA spoke with PT who stated therapy team has worked with pt. Noted Anticipated therapy need: Home with Home Healthcare    HH referral sent in [facility], choice list will be provided once available.   ALINA will continue to follow.     Addendum (4pm) - ALINA met with pt and pt's spouse at bedside. Discussed HH services, pt agreeable. SW provided pt with HH agency choice list. Pt's spouse selected [facility].    ALINA reserved [facility] in [facility]. ALINA notified H liaison of pt's discharge today.     KEVIN Norton  Discharge Planner

## 2025-06-26 VITALS
BODY MASS INDEX: 37.39 KG/M2 | WEIGHT: 211 LBS | TEMPERATURE: 98 F | SYSTOLIC BLOOD PRESSURE: 108 MMHG | HEIGHT: 62.99 IN | RESPIRATION RATE: 21 BRPM | OXYGEN SATURATION: 91 % | HEART RATE: 54 BPM | DIASTOLIC BLOOD PRESSURE: 55 MMHG